# Patient Record
Sex: FEMALE | Race: ASIAN | NOT HISPANIC OR LATINO
[De-identification: names, ages, dates, MRNs, and addresses within clinical notes are randomized per-mention and may not be internally consistent; named-entity substitution may affect disease eponyms.]

---

## 2017-01-06 ENCOUNTER — APPOINTMENT (OUTPATIENT)
Dept: PULMONOLOGY | Facility: CLINIC | Age: 42
End: 2017-01-06

## 2017-03-01 ENCOUNTER — APPOINTMENT (OUTPATIENT)
Dept: PULMONOLOGY | Facility: CLINIC | Age: 42
End: 2017-03-01

## 2017-07-01 ENCOUNTER — RX RENEWAL (OUTPATIENT)
Age: 42
End: 2017-07-01

## 2017-08-22 ENCOUNTER — RX RENEWAL (OUTPATIENT)
Age: 42
End: 2017-08-22

## 2017-10-02 ENCOUNTER — APPOINTMENT (OUTPATIENT)
Dept: PULMONOLOGY | Facility: CLINIC | Age: 42
End: 2017-10-02

## 2017-10-23 ENCOUNTER — APPOINTMENT (OUTPATIENT)
Dept: PULMONOLOGY | Facility: CLINIC | Age: 42
End: 2017-10-23

## 2018-02-26 ENCOUNTER — APPOINTMENT (OUTPATIENT)
Dept: PULMONOLOGY | Facility: CLINIC | Age: 43
End: 2018-02-26
Payer: OTHER GOVERNMENT

## 2018-02-26 VITALS
WEIGHT: 120 LBS | HEIGHT: 60.5 IN | OXYGEN SATURATION: 98 % | HEART RATE: 99 BPM | SYSTOLIC BLOOD PRESSURE: 100 MMHG | RESPIRATION RATE: 17 BRPM | BODY MASS INDEX: 22.95 KG/M2 | DIASTOLIC BLOOD PRESSURE: 70 MMHG

## 2018-02-26 DIAGNOSIS — R51 HEADACHE: ICD-10-CM

## 2018-02-26 PROCEDURE — 99214 OFFICE O/P EST MOD 30 MIN: CPT

## 2018-02-26 RX ORDER — BENZONATATE 200 MG/1
200 CAPSULE ORAL
Qty: 60 | Refills: 0 | Status: ACTIVE | COMMUNITY
Start: 2018-02-26 | End: 1900-01-01

## 2018-02-26 RX ORDER — HYDROCODONE BITARTRATE AND HOMATROPINE METHYLBROMIDE 5; 1.5 MG/5ML; MG/5ML
5-1.5 SYRUP ORAL
Qty: 240 | Refills: 0 | Status: ACTIVE | COMMUNITY
Start: 2018-02-26 | End: 1900-01-01

## 2018-03-09 ENCOUNTER — MEDICATION RENEWAL (OUTPATIENT)
Age: 43
End: 2018-03-09

## 2018-03-27 ENCOUNTER — APPOINTMENT (OUTPATIENT)
Dept: PULMONOLOGY | Facility: CLINIC | Age: 43
End: 2018-03-27
Payer: OTHER GOVERNMENT

## 2018-03-27 VITALS
BODY MASS INDEX: 23.56 KG/M2 | HEART RATE: 90 BPM | OXYGEN SATURATION: 98 % | HEIGHT: 60 IN | DIASTOLIC BLOOD PRESSURE: 60 MMHG | SYSTOLIC BLOOD PRESSURE: 110 MMHG | WEIGHT: 120 LBS

## 2018-03-27 PROCEDURE — 94010 BREATHING CAPACITY TEST: CPT

## 2018-03-27 PROCEDURE — 99214 OFFICE O/P EST MOD 30 MIN: CPT | Mod: 25

## 2018-03-27 RX ORDER — BROMPHENIRAMINE MALEATE, PSEUDOEPHEDRINE HYDROCHLORIDE, 2; 30; 10 MG/5ML; MG/5ML; MG/5ML
30-2-10 SYRUP ORAL
Qty: 105 | Refills: 0 | Status: ACTIVE | COMMUNITY
Start: 2017-10-02

## 2018-03-27 RX ORDER — GLYCOPYRROLATE AND FORMOTEROL FUMARATE 9; 4.8 UG/1; UG/1
9-4.8 AEROSOL, METERED RESPIRATORY (INHALATION)
Qty: 3 | Refills: 1 | Status: ACTIVE | COMMUNITY
Start: 2018-03-27 | End: 1900-01-01

## 2018-04-09 RX ORDER — CLARITHROMYCIN 500 MG/1
500 TABLET, FILM COATED ORAL
Qty: 20 | Refills: 0 | Status: DISCONTINUED | COMMUNITY
Start: 2018-02-26 | End: 2018-04-09

## 2018-04-09 RX ORDER — BECLOMETHASONE DIPROPIONATE 80 UG/1
80 AEROSOL, METERED RESPIRATORY (INHALATION) TWICE DAILY
Qty: 3 | Refills: 1 | Status: ACTIVE | COMMUNITY
Start: 2018-04-09 | End: 1900-01-01

## 2018-04-17 ENCOUNTER — EMERGENCY (EMERGENCY)
Facility: HOSPITAL | Age: 43
LOS: 1 days | Discharge: HOME | End: 2018-04-17
Attending: PERSONAL EMERGENCY RESPONSE ATTENDANT
Payer: COMMERCIAL

## 2018-04-17 ENCOUNTER — EMERGENCY (EMERGENCY)
Facility: HOSPITAL | Age: 43
LOS: 1 days | Discharge: LEFT BEFORE DISCHARGE | End: 2018-04-17
Admitting: EMERGENCY MEDICINE

## 2018-04-17 VITALS
HEIGHT: 61 IN | RESPIRATION RATE: 17 BRPM | SYSTOLIC BLOOD PRESSURE: 152 MMHG | DIASTOLIC BLOOD PRESSURE: 94 MMHG | HEART RATE: 75 BPM | WEIGHT: 119.93 LBS | OXYGEN SATURATION: 100 % | TEMPERATURE: 99 F

## 2018-04-17 LAB
ALBUMIN SERPL ELPH-MCNC: 4.1 G/DL — SIGNIFICANT CHANGE UP (ref 3.3–5)
ALP SERPL-CCNC: 56 U/L — SIGNIFICANT CHANGE UP (ref 40–120)
ALT FLD-CCNC: 15 U/L — SIGNIFICANT CHANGE UP (ref 10–45)
ANION GAP SERPL CALC-SCNC: 12 MMOL/L — SIGNIFICANT CHANGE UP (ref 5–17)
ANISOCYTOSIS BLD QL: SLIGHT — SIGNIFICANT CHANGE UP
APTT BLD: 26.9 SEC — LOW (ref 27.5–37.4)
AST SERPL-CCNC: 13 U/L — SIGNIFICANT CHANGE UP (ref 10–40)
BASE EXCESS BLDV CALC-SCNC: 0.7 MMOL/L — SIGNIFICANT CHANGE UP (ref -2–2)
BASOPHILS # BLD AUTO: 0 K/UL — SIGNIFICANT CHANGE UP (ref 0–0.2)
BILIRUB SERPL-MCNC: 0.3 MG/DL — SIGNIFICANT CHANGE UP (ref 0.2–1.2)
BLD GP AB SCN SERPL QL: NEGATIVE — SIGNIFICANT CHANGE UP
BUN SERPL-MCNC: 14 MG/DL — SIGNIFICANT CHANGE UP (ref 7–23)
CA-I SERPL-SCNC: 1.24 MMOL/L — SIGNIFICANT CHANGE UP (ref 1.12–1.3)
CALCIUM SERPL-MCNC: 9.6 MG/DL — SIGNIFICANT CHANGE UP (ref 8.4–10.5)
CHLORIDE BLDV-SCNC: 104 MMOL/L — SIGNIFICANT CHANGE UP (ref 96–108)
CHLORIDE SERPL-SCNC: 100 MMOL/L — SIGNIFICANT CHANGE UP (ref 96–108)
CO2 BLDV-SCNC: 27 MMOL/L — SIGNIFICANT CHANGE UP (ref 22–30)
CO2 SERPL-SCNC: 23 MMOL/L — SIGNIFICANT CHANGE UP (ref 22–31)
CREAT SERPL-MCNC: 0.75 MG/DL — SIGNIFICANT CHANGE UP (ref 0.5–1.3)
ELLIPTOCYTES BLD QL SMEAR: SLIGHT — SIGNIFICANT CHANGE UP
EOSINOPHIL # BLD AUTO: 1.9 K/UL — HIGH (ref 0–0.5)
EOSINOPHIL NFR BLD AUTO: 14 % — HIGH (ref 0–6)
GAS PNL BLDV: 136 MMOL/L — SIGNIFICANT CHANGE UP (ref 136–145)
GAS PNL BLDV: SIGNIFICANT CHANGE UP
GLUCOSE BLDV-MCNC: 98 MG/DL — SIGNIFICANT CHANGE UP (ref 70–99)
GLUCOSE SERPL-MCNC: 96 MG/DL — SIGNIFICANT CHANGE UP (ref 70–99)
HCO3 BLDV-SCNC: 26 MMOL/L — SIGNIFICANT CHANGE UP (ref 21–29)
HCT VFR BLD CALC: 28.2 % — LOW (ref 34.5–45)
HCT VFR BLDA CALC: 26 % — LOW (ref 39–50)
HGB BLD CALC-MCNC: 8.5 G/DL — LOW (ref 11.5–15.5)
HGB BLD-MCNC: 8.6 G/DL — LOW (ref 11.5–15.5)
HYPOCHROMIA BLD QL: SIGNIFICANT CHANGE UP
INR BLD: 0.98 RATIO — SIGNIFICANT CHANGE UP (ref 0.88–1.16)
LACTATE BLDV-MCNC: 1.1 MMOL/L — SIGNIFICANT CHANGE UP (ref 0.7–2)
LYMPHOCYTES # BLD AUTO: 27 % — SIGNIFICANT CHANGE UP (ref 13–44)
LYMPHOCYTES # BLD AUTO: 4.3 K/UL — HIGH (ref 1–3.3)
MACROCYTES BLD QL: SLIGHT — SIGNIFICANT CHANGE UP
MCHC RBC-ENTMCNC: 20.2 PG — LOW (ref 27–34)
MCHC RBC-ENTMCNC: 30.4 GM/DL — LOW (ref 32–36)
MCV RBC AUTO: 66.6 FL — LOW (ref 80–100)
MICROCYTES BLD QL: SIGNIFICANT CHANGE UP
MONOCYTES # BLD AUTO: 0.8 K/UL — SIGNIFICANT CHANGE UP (ref 0–0.9)
MONOCYTES NFR BLD AUTO: 6 % — SIGNIFICANT CHANGE UP (ref 2–14)
NEUTROPHILS # BLD AUTO: 7.4 K/UL — SIGNIFICANT CHANGE UP (ref 1.8–7.4)
NEUTROPHILS NFR BLD AUTO: 53 % — SIGNIFICANT CHANGE UP (ref 43–77)
OTHER CELLS CSF MANUAL: 3 ML/DL — LOW (ref 18–22)
PCO2 BLDV: 45 MMHG — SIGNIFICANT CHANGE UP (ref 35–50)
PH BLDV: 7.37 — SIGNIFICANT CHANGE UP (ref 7.35–7.45)
PLAT MORPH BLD: NORMAL — SIGNIFICANT CHANGE UP
PLATELET # BLD AUTO: 489 K/UL — HIGH (ref 150–400)
PO2 BLDV: 23 MMHG — LOW (ref 25–45)
POIKILOCYTOSIS BLD QL AUTO: SIGNIFICANT CHANGE UP
POLYCHROMASIA BLD QL SMEAR: SLIGHT — SIGNIFICANT CHANGE UP
POTASSIUM BLDV-SCNC: 4.2 MMOL/L — SIGNIFICANT CHANGE UP (ref 3.5–5)
POTASSIUM SERPL-MCNC: 4.3 MMOL/L — SIGNIFICANT CHANGE UP (ref 3.5–5.3)
POTASSIUM SERPL-SCNC: 4.3 MMOL/L — SIGNIFICANT CHANGE UP (ref 3.5–5.3)
PROT SERPL-MCNC: 7.5 G/DL — SIGNIFICANT CHANGE UP (ref 6–8.3)
PROTHROM AB SERPL-ACNC: 10.6 SEC — SIGNIFICANT CHANGE UP (ref 9.8–12.7)
RBC # BLD: 4.23 M/UL — SIGNIFICANT CHANGE UP (ref 3.8–5.2)
RBC # FLD: 17.2 % — HIGH (ref 10.3–14.5)
RBC BLD AUTO: ABNORMAL
RH IG SCN BLD-IMP: POSITIVE — SIGNIFICANT CHANGE UP
SAO2 % BLDV: 25 % — LOW (ref 67–88)
SODIUM SERPL-SCNC: 135 MMOL/L — SIGNIFICANT CHANGE UP (ref 135–145)
TARGETS BLD QL SMEAR: SLIGHT — SIGNIFICANT CHANGE UP
WBC # BLD: 14.6 K/UL — HIGH (ref 3.8–10.5)
WBC # FLD AUTO: 14.6 K/UL — HIGH (ref 3.8–10.5)

## 2018-04-17 PROCEDURE — 80053 COMPREHEN METABOLIC PANEL: CPT

## 2018-04-17 PROCEDURE — 85027 COMPLETE CBC AUTOMATED: CPT

## 2018-04-17 PROCEDURE — 93010 ELECTROCARDIOGRAM REPORT: CPT

## 2018-04-17 PROCEDURE — 82803 BLOOD GASES ANY COMBINATION: CPT

## 2018-04-17 PROCEDURE — 99283 EMERGENCY DEPT VISIT LOW MDM: CPT | Mod: 25

## 2018-04-17 PROCEDURE — 84484 ASSAY OF TROPONIN QUANT: CPT

## 2018-04-17 PROCEDURE — 85730 THROMBOPLASTIN TIME PARTIAL: CPT

## 2018-04-17 PROCEDURE — 84295 ASSAY OF SERUM SODIUM: CPT

## 2018-04-17 PROCEDURE — 81001 URINALYSIS AUTO W/SCOPE: CPT

## 2018-04-17 PROCEDURE — 84132 ASSAY OF SERUM POTASSIUM: CPT

## 2018-04-17 PROCEDURE — 83605 ASSAY OF LACTIC ACID: CPT

## 2018-04-17 PROCEDURE — 82947 ASSAY GLUCOSE BLOOD QUANT: CPT

## 2018-04-17 PROCEDURE — 94640 AIRWAY INHALATION TREATMENT: CPT

## 2018-04-17 PROCEDURE — 82435 ASSAY OF BLOOD CHLORIDE: CPT

## 2018-04-17 PROCEDURE — 86900 BLOOD TYPING SEROLOGIC ABO: CPT

## 2018-04-17 PROCEDURE — 85014 HEMATOCRIT: CPT

## 2018-04-17 PROCEDURE — 93005 ELECTROCARDIOGRAM TRACING: CPT

## 2018-04-17 PROCEDURE — 85610 PROTHROMBIN TIME: CPT

## 2018-04-17 PROCEDURE — 86901 BLOOD TYPING SEROLOGIC RH(D): CPT

## 2018-04-17 PROCEDURE — 99285 EMERGENCY DEPT VISIT HI MDM: CPT | Mod: 25

## 2018-04-17 PROCEDURE — 82330 ASSAY OF CALCIUM: CPT

## 2018-04-17 PROCEDURE — 86850 RBC ANTIBODY SCREEN: CPT

## 2018-04-17 RX ORDER — ALBUTEROL 90 UG/1
1 AEROSOL, METERED ORAL EVERY 4 HOURS
Qty: 0 | Refills: 0 | Status: DISCONTINUED | OUTPATIENT
Start: 2018-04-17 | End: 2018-04-21

## 2018-04-17 RX ORDER — TIOTROPIUM BROMIDE 18 UG/1
1 CAPSULE ORAL; RESPIRATORY (INHALATION) DAILY
Qty: 0 | Refills: 0 | Status: DISCONTINUED | OUTPATIENT
Start: 2018-04-17 | End: 2018-04-21

## 2018-04-17 RX ORDER — IPRATROPIUM/ALBUTEROL SULFATE 18-103MCG
3 AEROSOL WITH ADAPTER (GRAM) INHALATION EVERY 6 HOURS
Qty: 0 | Refills: 0 | Status: DISCONTINUED | OUTPATIENT
Start: 2018-04-17 | End: 2018-04-21

## 2018-04-17 RX ADMIN — Medication 3 MILLILITER(S): at 23:01

## 2018-04-17 NOTE — ED PROVIDER NOTE - PROGRESS NOTE DETAILS
Well appearing, no reoccurrence of sx. Continues to have coarse breath sounds but moving air throughout all lung fields. Pt is not currently on oral steroids, nor does feel that she is asthma exacerbated. Takes inhaled steroids and f/u with pulmonology. Denies SOB during time of syncope last night. Labs urine non actionable. EKG non actionable. Pt has no pain associated with this event. Reassured and advised to f/u with cardiology for routine exam and ongoing medical management. Given strict return precautions. Return for any and all of the following: CP, reoccurring syncope, lightheadedness, SOB, severe chest abd or back pain. Pt verbalizes understanding of these recommendations. And is stable for discharge.

## 2018-04-17 NOTE — ED PROVIDER NOTE - NS_ ATTENDINGSCRIBEDETAILS _ED_A_ED_FT
Attending MD St.  Agree with above.  PT seen and assessed with scribe assistance in documentation in real time.

## 2018-04-17 NOTE — ED PROVIDER NOTE - MEDICAL DECISION MAKING DETAILS
44 y/o F pt with PMHx of anemia, asthma presents to the ED for lightheadedness/dizziness and palpitations s/p syncope episode last night. 42 y/o well appearing F pt with PMHx of anemia, asthma presents to the ED for lightheadedness/dizziness and palpitations s/p syncope episode last night. No CP prior to, during or following event. Pt has synopsized previously with hx of anemia which required transfusion in the past. No personal or FHx of CAD. 44 y/o well appearing F pt with PMHx of anemia, asthma presents to the ED for lightheadedness/dizziness and palpitations s/p syncope episode last night. No CP prior to, during or following event. Pt has synopsized previously with hx of anemia which required transfusion in the past. No personal or FHx of CAD. No leg pain or swelling, no OCP. Pt is PERC negative. 44 y/o well appearing F pt with PMHx of anemia, asthma presents to the ED for lightheadedness/dizziness and palpitations s/p syncope episode last night. No CP prior to, during or following event. Pt has synopsized previously with hx of anemia which required transfusion in the past. No personal or FHx of CAD. No leg pain or swelling, no OCP. Pt is PERC negative.  Denies all pain complaints.  Denies urinary sxs, pregnancy negative.

## 2018-04-17 NOTE — ED PROVIDER NOTE - OBJECTIVE STATEMENT
44 y/o F pt with PMHx of anemia, asthma c/o lightheadedness/dizziness s/p syncopal episode last night with intermittent palpitations. Pt was using the bathroom and washed her hands when she heard a weird sound in her head and synopsized. As per , pt was down for a few seconds before she regained consciousness. Pt had hx of similar sx. In the past, she had a syncopal episode s/p leg surgery 1.5 years ago and had to get a blood transfusion. States that her left leg is always in pain but has improved since the injury. Pt comes to the ED today because she wanted to get a blood work completed to see if she needed a blood transfusion. Pt is sexually active. Pt is from [facility]. Denies CP, hx of blood clot, hx of CAD, FHx of CAD or any other complaints. LMP: March 20 2018 Current medication: Symbicort, Prilosec, Zyrtec

## 2018-04-18 VITALS
RESPIRATION RATE: 18 BRPM | OXYGEN SATURATION: 99 % | SYSTOLIC BLOOD PRESSURE: 148 MMHG | HEART RATE: 82 BPM | TEMPERATURE: 98 F | DIASTOLIC BLOOD PRESSURE: 89 MMHG

## 2018-04-18 LAB
APPEARANCE UR: CLEAR — SIGNIFICANT CHANGE UP
BILIRUB UR-MCNC: NEGATIVE — SIGNIFICANT CHANGE UP
COLOR SPEC: SIGNIFICANT CHANGE UP
DIFF PNL FLD: NEGATIVE — SIGNIFICANT CHANGE UP
GLUCOSE UR QL: NEGATIVE — SIGNIFICANT CHANGE UP
KETONES UR-MCNC: NEGATIVE — SIGNIFICANT CHANGE UP
LEUKOCYTE ESTERASE UR-ACNC: NEGATIVE — SIGNIFICANT CHANGE UP
NITRITE UR-MCNC: NEGATIVE — SIGNIFICANT CHANGE UP
PH UR: 5.5 — SIGNIFICANT CHANGE UP (ref 5–8)
PROT UR-MCNC: NEGATIVE — SIGNIFICANT CHANGE UP
RBC CASTS # UR COMP ASSIST: SIGNIFICANT CHANGE UP /HPF (ref 0–2)
SP GR SPEC: 1.01 — SIGNIFICANT CHANGE UP (ref 1.01–1.02)
UROBILINOGEN FLD QL: NEGATIVE — SIGNIFICANT CHANGE UP
WBC UR QL: SIGNIFICANT CHANGE UP /HPF (ref 0–5)

## 2018-04-18 NOTE — ED ADULT NURSE NOTE - OBJECTIVE STATEMENT
Received patient awake and alert x 4, presenting to the ED with syncopal episode last night along with intermittent palpitations. States she was washing her hands when suddenly her hearing felt fuzzy, as per  patient was down for a few seconds before regained consciousness. C/O back pain, states she does have a history of anemia and has had this episode in the past before, denies hitting head. denies fever/chills. breathing unlabored with no acute distress noted.

## 2018-06-19 ENCOUNTER — NON-APPOINTMENT (OUTPATIENT)
Age: 43
End: 2018-06-19

## 2018-06-19 ENCOUNTER — APPOINTMENT (OUTPATIENT)
Dept: PULMONOLOGY | Facility: CLINIC | Age: 43
End: 2018-06-19
Payer: OTHER GOVERNMENT

## 2018-06-19 VITALS
SYSTOLIC BLOOD PRESSURE: 120 MMHG | DIASTOLIC BLOOD PRESSURE: 70 MMHG | BODY MASS INDEX: 23.56 KG/M2 | OXYGEN SATURATION: 97 % | HEART RATE: 74 BPM | WEIGHT: 120 LBS | HEIGHT: 60 IN

## 2018-06-19 PROCEDURE — 94010 BREATHING CAPACITY TEST: CPT

## 2018-06-19 PROCEDURE — 99214 OFFICE O/P EST MOD 30 MIN: CPT | Mod: 25

## 2018-06-19 RX ORDER — DESLORATADINE 5 MG/1
5 TABLET ORAL DAILY
Qty: 90 | Refills: 1 | Status: ACTIVE | COMMUNITY
Start: 2018-06-19 | End: 1900-01-01

## 2018-06-19 RX ORDER — BECLOMETHASONE DIPROPIONATE 80 UG/1
80 AEROSOL, METERED RESPIRATORY (INHALATION) TWICE DAILY
Qty: 3 | Refills: 1 | Status: ACTIVE | COMMUNITY
Start: 2018-06-19 | End: 1900-01-01

## 2018-09-25 ENCOUNTER — NON-APPOINTMENT (OUTPATIENT)
Age: 43
End: 2018-09-25

## 2018-09-25 ENCOUNTER — APPOINTMENT (OUTPATIENT)
Dept: PULMONOLOGY | Facility: CLINIC | Age: 43
End: 2018-09-25
Payer: OTHER GOVERNMENT

## 2018-09-25 VITALS
DIASTOLIC BLOOD PRESSURE: 70 MMHG | BODY MASS INDEX: 24.35 KG/M2 | WEIGHT: 124 LBS | OXYGEN SATURATION: 97 % | HEIGHT: 60 IN | SYSTOLIC BLOOD PRESSURE: 120 MMHG | HEART RATE: 68 BPM

## 2018-09-25 PROCEDURE — 99214 OFFICE O/P EST MOD 30 MIN: CPT | Mod: 25

## 2018-09-25 PROCEDURE — 94010 BREATHING CAPACITY TEST: CPT

## 2018-09-25 RX ORDER — ACLIDINIUM BROMIDE 400 UG/1
400 POWDER, METERED RESPIRATORY (INHALATION) TWICE DAILY
Qty: 3 | Refills: 1 | Status: ACTIVE | COMMUNITY
Start: 2018-09-25 | End: 1900-01-01

## 2018-09-25 RX ORDER — FLUTICASONE PROPIONATE 220 UG/1
220 AEROSOL, METERED RESPIRATORY (INHALATION) TWICE DAILY
Qty: 3 | Refills: 5 | Status: ACTIVE | COMMUNITY
Start: 2018-09-25 | End: 1900-01-01

## 2018-09-25 RX ORDER — ZILEUTON 600 MG/1
600 TABLET, FILM COATED, EXTENDED RELEASE ORAL
Qty: 180 | Refills: 1 | Status: ACTIVE | COMMUNITY
Start: 2018-09-25 | End: 1900-01-01

## 2018-09-25 NOTE — ADDENDUM
[FreeTextEntry1] : Documented by Zach Rodriguez acting as a scribe for Dr. Jose Antonio Coe on 9/25/18\par \par All medical record entries made by the Scribe were at my, Dr. Jose Antonio Coe's, direction and personally dictated by me on 9/25/18. I have reviewed the chart and agree that the record accurately reflects my personal performance of the history, physical exam, assessment and plan. I have also personally directed, reviewed, and agree with the discharge instructions. \par \par \par \par \par

## 2018-09-25 NOTE — PROCEDURE
[FreeTextEntry1] : PFT- spi reveals mild to moderate obstructive dysfunction; FEV1 was 1.52 L which is 66% of predicted; normal flow volume loop

## 2018-09-25 NOTE — HISTORY OF PRESENT ILLNESS
[FreeTextEntry1] : Ms. Mortensen is a 43 year old female presenting to the office for a follow up visit for allergic rhinitis, asthma, cough, GERD, low vitamin D, post-nasal drip, sinus issues, snoring and shortness of breath. Her chief complaint is sin\par - She comes in complaining of sinus congestion \par - She comes in stating that she was sick the previous week leading to current wheezing\par - She had a frequent sour taste in her mouth 3 days ago\par - She has itchy eyes an ears\par - She has gained some weight\par - She is bringing up light yellow and clear sputum. \par - She is sleeping well. \par - She is unsure as to whether she snores. \par - She expresses a desire to avoid prednisone.

## 2018-09-25 NOTE — ASSESSMENT
[FreeTextEntry1] : Ms. Mortensen is currently symptomatic of eosinophilic/ allergic asthma, allergy, and self-induced GERD. She is non-compliant with her treatment protocol. \par \par Her shortness of breath is multifactorial due to: \par -out of shape\par -poor breathing mechanics\par -asthma\par \par problem 1: eosinophilic asthma/severe persistent asthma\par -add Symbicort 160 2 inhalations BID\par - Continue Flovent MDI 2 inhalations BID (200)\par - Add Tudorza 1 inhalation BID\par -add Albuterol nebulizer at least BID up to QiD (gargle and spit after use) \par -pending results of recent CBC--patient potentially a candidate for Fasenra\par -Asthma is  believed to be caused by inherited (genetic) and environmental factor, but its exact cause is unknown. Asthma may be triggered by allergens, lung infections, or irritants in the air. Asthma triggers are different for each person\par \par problem 2: allergies/sinus\par - Add Zyflo 1200 mg BID\par - Recommended the Navage \par -continue Dymista nasal spray 1 inhalation per nostril BID \par -continue Zyrtec 10 mg QHS \par - Continue Clarinex 5 mg QHS\par - Recheck eosinophil level, IgE level, Vitamin D level \par -Environmental measures for allergies were encouraged including mattress and pillow cover, air purifier, and environmental controls. \par \par problem 3: GERD\par - Add Zantac 300 mg QHS\par -continue Omeprazole 20mg \par -Things to avoid including overeating, spicy foods, tight clothing, eating within three hours of bed, this list is not all inclusive. \par -For treatment of reflux, possible options discussed including diet control, H2 blockers, PPIs, as well as coating motility agents discussed as treatment options. Timing of meals and proximity of last meal to sleep were discussed. If symptoms persist, a formal gastrointestinal evaluation is needed.\par -Rule of 2's: Avoid eating too late, too fast, too much, too spicy or within two hours of bedtime \par \par problem 4: low vitamin D\par -recommended to take Vitamin D supplements 50,000 units twice monthly \par -Low vitamin D Has been associated with asthma exacerbations and increased allergic symptoms. The goal based on recent information is maintaining levels between 50-70 and low normal is 30. Recommended 50,000 units every two weeks to once a month depending on the level. \par \par Problem 5: r/o OSAS\par -She is to have a in lab sleep study to rule out OSAS (as per her insurance) \par - Due to his / her EDS, fatigue, snoring, elevated Mallampati class, poor memory, poor concentration, and neck size, he / she is being recommended for a home sleep study. \par - Sleep apnea is associated with adverse clinical consequences which an affect most organ systems. Cardiovascular disease risk includes arrhythmias, atrial fibrillation, hypertension, coronary artery disease, and stroke. Metabolic disorders include diabetes type 2, non-alcoholic fatty liver disease. Mood disorder especially depression; and cognitive decline especially in the elderly. Associations with chronic reflux/Tuttle’s esophagus some but not all inclusive. \par -Reasons include arousal consistent with hypopnea; respiratory events most prominent in REM sleep or supine position; therefore sleep staging and body position are important for accurate diagnosis and estimation of AHI. \par \par problem 6: poor breathing mechanics\par -Proper breathing techniques were reviewed with an emphasis of exhalation. Patient instructed to breath in for 1 second and out for four seconds. Patient was encouraged to not talk while walking.\par \par problem 7: out of shape\par -Exercise and diet control were discussed and are highly encouraged. Treatment options were given such as, aqua therapy, and contacting a nutritionist. Recommended to use the elliptical, stationary bike, less use of treadmill. Mindful eating was explained to the patient. Obesity is associated with worsening asthma, shortness of breath, and potential for cardiac disease, diabetes, and other underlying medical conditions. \par \par problem 8: health maintenance \par -recommended to continute to diet and exercise, try to have an anti-inflammatory diet \par -recommended influenza vaccination\par -recommended strep pneumonia vaccines: Prevnar-13 vaccine, followed by Pneumo vaccine 23 one year following\par -recommended early intervention for URIs\par -recommended regular osteoporosis evaluations\par -recommended early dermatological evaluations\par -recommended after the age of 50 to the age of 70, colonoscopy every 5 years \par  \par \par F/u in 2 months \par pt is encouraged to call or fax the office with any questions or concerns.

## 2018-09-25 NOTE — PHYSICAL EXAM

## 2018-09-25 NOTE — REASON FOR VISIT
[Follow-Up] : a follow-up visit [FreeTextEntry1] : allergic rhinitis, asthma, cough, GERD, low vitamin D, post-nasal drip, sinus issues, snoring and shortness of breath

## 2018-11-27 ENCOUNTER — RX RENEWAL (OUTPATIENT)
Age: 43
End: 2018-11-27

## 2019-02-14 ENCOUNTER — APPOINTMENT (OUTPATIENT)
Dept: PULMONOLOGY | Facility: CLINIC | Age: 44
End: 2019-02-14
Payer: OTHER GOVERNMENT

## 2019-02-14 ENCOUNTER — NON-APPOINTMENT (OUTPATIENT)
Age: 44
End: 2019-02-14

## 2019-02-14 VITALS
RESPIRATION RATE: 17 BRPM | HEART RATE: 85 BPM | BODY MASS INDEX: 22.97 KG/M2 | SYSTOLIC BLOOD PRESSURE: 124 MMHG | OXYGEN SATURATION: 96 % | HEIGHT: 60 IN | WEIGHT: 117 LBS | DIASTOLIC BLOOD PRESSURE: 70 MMHG

## 2019-02-14 PROCEDURE — 99214 OFFICE O/P EST MOD 30 MIN: CPT | Mod: 25

## 2019-02-14 PROCEDURE — 94010 BREATHING CAPACITY TEST: CPT

## 2019-02-14 RX ORDER — GUAIFENESIN AND CODEINE PHOSPHATE 100; 10 MG/5ML; MG/5ML
100-10 SYRUP ORAL
Qty: 240 | Refills: 0 | Status: ACTIVE | COMMUNITY
Start: 2019-02-14 | End: 1900-01-01

## 2019-02-14 RX ORDER — ZAFIRLUKAST 20 MG/1
20 TABLET, COATED ORAL
Qty: 1 | Refills: 0 | Status: ACTIVE | COMMUNITY
Start: 2019-02-14 | End: 1900-01-01

## 2019-02-14 NOTE — ASSESSMENT
[FreeTextEntry1] : The plan is as follows: \par \par problem 1: asthmatic bronchitis with asthma flare\par -add doxycycline for bronchitis and sinus symptoms\par -Start Prednisone taper of 30 mg x 5 days, 20 mg x 5 days and 10 mg x 5 days- educational/instructional sheet provided that discusses side effects and directions given to patient.    Patient will need to follow up with a call in 5 days.\par -hold Symbicort\par -add Trelegy inhaler 1 puff once daily in am rinse and gargle (14 day sample given)- once complete go back to Symbicort 160 2 puffs BID rinse and gargle after use\par -add Albuterol nebulizer at least BID up to QiD (gargle and spit after use)\par -add accolate 20 mg PO BID \par -blood work needs to be completed to assess candidacy for biologics- will also need pre and post BD PFTs\par \par problem 2: post nasal drip and sinus congestion \par -restart fluticasone 1 spray each nostril am and pm\par -add azelastine nasal spray 2 sprays each nostril am and pm\par -continue Zyrtec 10 mg QHS \par - Recheck eosinophil level, IgE level, Vitamin D level \par \par problem 3: GERD-with increased symptoms\par - Add Zantac 300 mg QHS\par -continue prilosec 20mg in am - pt did not tolerate higher dose \par \par problem 4: low vitamin D\par -recommended to take Vitamin D supplements 50,000 units twice monthly \par -Low vitamin D Has been associated with asthma exacerbations and increased allergic symptoms. The goal based on recent information is maintaining levels between 50-70 and low normal is 30. Recommended 50,000 units every two weeks to once a month depending on the level. \par \par Problem 5: r/o OSAS- hx of snoring, fatigue\par -She is to have a in lab sleep study to rule out OSAS (as per her insurance) - pt has yet to complete this\par  \par \par F/u in 2 months/as scheduled with Dr. Coe\par She was advised to give me an update next week\par pt is encouraged to call or fax the office with any questions or concerns.

## 2019-02-14 NOTE — PHYSICAL EXAM

## 2019-02-14 NOTE — REVIEW OF SYSTEMS
[Fever] : no fever [Chills] : no chills [Fatigue] : fatigue [Poor Appetite] : normal appetite  [Nasal Congestion] : nasal congestion [Postnasal Drip] : postnasal drip [Sinus Problems] : sinus problems [Sore Throat] : no sore throat [Dry Mouth] : no dry mouth [Mouth Ulcers] : no mouth ulcers [Cough] : cough [Sputum] : sputum  [Dyspnea] : dyspnea [Chest Tightness] : chest tightness [Wheezing] : wheezing [Heartburn] : heartburn [Reflux] : reflux [Dysphagia] : no dysphagia [Nausea] : no nausea [Vomiting] : no vomiting [Constipation] : no constipation [Diarrhea] : no diarrhea [Abdominal Pain] : no abdominal pain [As Noted in HPI] : as noted in HPI [Headache] : headache [Difficulty Initiating Sleep] : difficulty falling asleep [Difficulty Maintaining Sleep] : difficulty maintaining sleep [Nonrestorative Sleep] : nonrestorative sleep [Negative] : Pulmonary Hypertension [de-identified] : due to cough

## 2019-02-14 NOTE — HISTORY OF PRESENT ILLNESS
[FreeTextEntry1] : Ms. Mortensen is a 43 year old female presenting to the office for an acute visit for allergic rhinitis, asthma, cough, GERD, low vitamin D, post-nasal drip, sinus issues, snoring and shortness of breath. Her chief complaint is cough and SOB. \par \par She reports that she has been sick since December. She was complaining of cough, congestion, fevers, and sinus complaints. She was placed on augmentin and she reports she had some relief however her symptoms have returned aside from fever. \par \par She reports cough with yellow phlegm, chest congestion, shortness of breath- especially with stairs. She states she is dealing with severe nasal congestion and post nasal drip. She reports wheezing, heaviness and tightness in her chest and poor sleep due to cough.  She is currently using her rescue inhaler and Symbicort only.  She admits to increased reflux and she is on OTC PPI.\par \par She otherwise denies fever, chills, body aches and pain, chest pain, appetite loss, sore throat, ear pain or pressure, rashes or new muscle cramps. \par \par She has a nebulizer with albuterol at home. Currently not using it. \par \par She never completed blood work previously prescribed but plans to get it done to see if she is a candidate for biologics. She is not sure she would go on one at this point but is willing to find out if it is an option for her down the road.

## 2019-02-19 ENCOUNTER — APPOINTMENT (OUTPATIENT)
Dept: PULMONOLOGY | Facility: CLINIC | Age: 44
End: 2019-02-19

## 2019-02-26 ENCOUNTER — MEDICATION RENEWAL (OUTPATIENT)
Age: 44
End: 2019-02-26

## 2019-03-11 RX ORDER — AZELASTINE HYDROCHLORIDE 205.5 UG/1
0.15 SPRAY, METERED NASAL TWICE DAILY
Qty: 3 | Refills: 1 | Status: ACTIVE | COMMUNITY
Start: 2019-02-14 | End: 1900-01-01

## 2019-03-11 RX ORDER — RANITIDINE 300 MG/1
300 TABLET ORAL
Qty: 90 | Refills: 1 | Status: ACTIVE | COMMUNITY
Start: 2019-02-14 | End: 1900-01-01

## 2019-06-24 ENCOUNTER — APPOINTMENT (OUTPATIENT)
Dept: PULMONOLOGY | Facility: CLINIC | Age: 44
End: 2019-06-24

## 2019-08-28 ENCOUNTER — APPOINTMENT (OUTPATIENT)
Dept: OBGYN | Facility: CLINIC | Age: 44
End: 2019-08-28

## 2019-09-23 ENCOUNTER — RX RENEWAL (OUTPATIENT)
Age: 44
End: 2019-09-23

## 2019-09-26 ENCOUNTER — NON-APPOINTMENT (OUTPATIENT)
Age: 44
End: 2019-09-26

## 2019-09-26 ENCOUNTER — APPOINTMENT (OUTPATIENT)
Dept: PULMONOLOGY | Facility: CLINIC | Age: 44
End: 2019-09-26
Payer: OTHER GOVERNMENT

## 2019-09-26 VITALS
HEIGHT: 60 IN | SYSTOLIC BLOOD PRESSURE: 110 MMHG | WEIGHT: 118 LBS | RESPIRATION RATE: 17 BRPM | OXYGEN SATURATION: 96 % | HEART RATE: 81 BPM | DIASTOLIC BLOOD PRESSURE: 80 MMHG | BODY MASS INDEX: 23.16 KG/M2

## 2019-09-26 DIAGNOSIS — Z86.2 PERSONAL HISTORY OF DISEASES OF THE BLOOD AND BLOOD-FORMING ORGANS AND CERTAIN DISORDERS INVOLVING THE IMMUNE MECHANISM: ICD-10-CM

## 2019-09-26 DIAGNOSIS — R09.89 OTHER SPECIFIED SYMPTOMS AND SIGNS INVOLVING THE CIRCULATORY AND RESPIRATORY SYSTEMS: ICD-10-CM

## 2019-09-26 PROCEDURE — 71046 X-RAY EXAM CHEST 2 VIEWS: CPT

## 2019-09-26 PROCEDURE — 99215 OFFICE O/P EST HI 40 MIN: CPT | Mod: 25

## 2019-09-26 PROCEDURE — 94010 BREATHING CAPACITY TEST: CPT

## 2019-09-26 RX ORDER — ZAFIRLUKAST 20 MG/1
20 TABLET, COATED ORAL
Qty: 60 | Refills: 3 | Status: ACTIVE | COMMUNITY
Start: 2019-09-26 | End: 1900-01-01

## 2019-09-26 RX ORDER — AZELASTINE HYDROCHLORIDE 137 UG/1
0.1 SPRAY, METERED NASAL TWICE DAILY
Qty: 1 | Refills: 3 | Status: ACTIVE | COMMUNITY
Start: 2019-09-26 | End: 1900-01-01

## 2019-09-26 RX ORDER — FLUTICASONE PROPIONATE 93 UG/1
93 SPRAY, METERED NASAL
Qty: 1 | Refills: 0 | Status: ACTIVE | COMMUNITY
Start: 2019-09-26 | End: 1900-01-01

## 2019-09-26 RX ORDER — CLARITHROMYCIN 500 MG/1
500 TABLET, FILM COATED ORAL
Qty: 20 | Refills: 0 | Status: ACTIVE | COMMUNITY
Start: 2019-09-26 | End: 1900-01-01

## 2019-10-04 NOTE — PHYSICAL EXAM
[General Appearance - Well Developed] : well developed [Normal Appearance] : normal appearance [General Appearance - Well Nourished] : well nourished [Well Groomed] : well groomed [No Deformities] : no deformities [General Appearance - In No Acute Distress] : no acute distress [Normal Conjunctiva] : the conjunctiva exhibited no abnormalities [Eyelids - No Xanthelasma] : the eyelids demonstrated no xanthelasmas [II] : II [Normal Oropharynx] : normal oropharynx [Neck Appearance] : the appearance of the neck was normal [Neck Cervical Mass (___cm)] : no neck mass was observed [Jugular Venous Distention Increased] : there was no jugular-venous distention [Heart Sounds] : normal S1 and S2 [Heart Rate And Rhythm] : heart rate and rhythm were normal [Murmurs] : no murmurs present [Respiration, Rhythm And Depth] : normal respiratory rhythm and effort [Exaggerated Use Of Accessory Muscles For Inspiration] : no accessory muscle use [Abdomen Soft] : soft [Abdomen Tenderness] : non-tender [Abdomen Mass (___ Cm)] : no abdominal mass palpated [Gait - Sufficient For Exercise Testing] : the gait was sufficient for exercise testing [Abnormal Walk] : normal gait [Nail Clubbing] : no clubbing of the fingernails [Cyanosis, Localized] : no localized cyanosis [Petechial Hemorrhages (___cm)] : no petechial hemorrhages [Skin Color & Pigmentation] : normal skin color and pigmentation [] : no rash [No Venous Stasis] : no venous stasis [Skin Lesions] : no skin lesions [No Skin Ulcers] : no skin ulcer [No Xanthoma] : no  xanthoma was observed [No Focal Deficits] : no focal deficits [Oriented To Time, Place, And Person] : oriented to person, place, and time [Impaired Insight] : insight and judgment were intact [Affect] : the affect was normal [Mood] : the mood was normal [FreeTextEntry1] : I:E 1:3, expiratory wheeze and rhonchi b/l

## 2019-10-04 NOTE — PROCEDURE
[FreeTextEntry1] : SPI with very severe obstruction\par \par CXR: Interpreted by Dr. Coe\par -normal chest radiograph

## 2019-10-04 NOTE — REVIEW OF SYSTEMS
[Chills] : chills [Fatigue] : fatigue [Nasal Congestion] : nasal congestion [Postnasal Drip] : postnasal drip [Sinus Problems] : sinus problems [Cough] : cough [Sputum] : sputum  [Dyspnea] : dyspnea [Chest Tightness] : chest tightness [Wheezing] : wheezing [As Noted in HPI] : as noted in HPI [Heartburn] : heartburn [Reflux] : reflux [Headache] : headache [Difficulty Initiating Sleep] : difficulty falling asleep [Difficulty Maintaining Sleep] : difficulty maintaining sleep [Nonrestorative Sleep] : nonrestorative sleep [Negative] : Pulmonary Hypertension [Fever] : no fever [Poor Appetite] : normal appetite  [Sore Throat] : no sore throat [Dry Mouth] : no dry mouth [Mouth Ulcers] : no mouth ulcers [Dysphagia] : no dysphagia [Nausea] : no nausea [Vomiting] : no vomiting [Constipation] : no constipation [Diarrhea] : no diarrhea [Abdominal Pain] : no abdominal pain [de-identified] : due to cough

## 2019-10-04 NOTE — ASSESSMENT
[FreeTextEntry1] : The plan is as follows: \par \par problem 1: asthmatic bronchitis with severe asthma flare\par -add Biaxin 500 mg PO BID x 10 days\par -Start Prednisone taper of 30 mg x 5 days, 20 mg x 5 days and 10 mg x 5 days- educational/instructional sheet provided that discusses side effects and directions given to patient.    Patient will need to follow up with a call in 5 days.\par -add Albuterol nebulizer at least BID up to QiD (gargle and spit after use)\par -add Budesonide via neb BID rinse and gargle\par -Continue Symbicort 160 2 puffs BID rinse and gargle after use\par -add Spiriva Respimat 2 puffs \par -add accolate 20 BID-add accolate 20 mg PO BID \par -blood work needs to be completed to assess candidacy for biologics- will also need pre and post BD PFTs if not completed; pt can also have the other labs faxed over to see if she can qualify with those.\par \par problem 2: Nasal polyps and severe nasal congestion with PND\par -failed fluticasone and nasonex\par -trial xhance 1 spray am and pm\par -restart azelastine nasal spray 2 sprays am and pm\par -continue Zyrtec 10 mg QHS \par \par problem 3: GERD-with increased symptoms\par - Add pepcid 40 mg QHS\par -continue prilosec 20mg in am - pt did not tolerate higher dose \par \par problem 4: hx of eosinophilia and probable eosinophilic asthma\par -if eos are still high enough she is a candidate for biologic therapy for her severe persistent asthma\par -both Nucala and fasenra were discussed in detail\par -she is considering Nucala for home injection: Patient information was given to patient regarding drug including indication/commitment to q 4 week injections.  Discussed with patient that eosinophils may be one of the contributing cells that cause airway inflammation in those with asthma and with elevated levels greater than 150. Nucala works by reducing blood eosinophil levels and hopefully reducing airway inflammation, she will fill out paperwork today\par -Cost and coverage TBD once enrolled, discussed that it was an expensive drug and there may be a copay associated.\par -enrollment/prior auth may take weeks depending on insurance coverage/info requested by insurance.\par \par \par F/u in 2-3 months with SPI with Dr. Coe or myself \par She was advised to give me an update next week\par pt is encouraged to call or fax the office with any questions or concerns.

## 2019-12-08 ENCOUNTER — FORM ENCOUNTER (OUTPATIENT)
Age: 44
End: 2019-12-08

## 2019-12-09 ENCOUNTER — APPOINTMENT (OUTPATIENT)
Dept: OBGYN | Facility: CLINIC | Age: 44
End: 2019-12-09
Payer: OTHER GOVERNMENT

## 2019-12-09 ENCOUNTER — RESULT REVIEW (OUTPATIENT)
Age: 44
End: 2019-12-09

## 2019-12-09 PROCEDURE — 99386 PREV VISIT NEW AGE 40-64: CPT

## 2019-12-09 PROCEDURE — 36415 COLL VENOUS BLD VENIPUNCTURE: CPT

## 2019-12-11 ENCOUNTER — FORM ENCOUNTER (OUTPATIENT)
Age: 44
End: 2019-12-11

## 2019-12-23 ENCOUNTER — APPOINTMENT (OUTPATIENT)
Dept: PULMONOLOGY | Facility: CLINIC | Age: 44
End: 2019-12-23

## 2019-12-30 ENCOUNTER — APPOINTMENT (OUTPATIENT)
Dept: ULTRASOUND IMAGING | Facility: IMAGING CENTER | Age: 44
End: 2019-12-30
Payer: OTHER GOVERNMENT

## 2019-12-30 ENCOUNTER — OUTPATIENT (OUTPATIENT)
Dept: OUTPATIENT SERVICES | Facility: HOSPITAL | Age: 44
LOS: 1 days | End: 2019-12-30
Payer: COMMERCIAL

## 2019-12-30 ENCOUNTER — APPOINTMENT (OUTPATIENT)
Dept: MAMMOGRAPHY | Facility: IMAGING CENTER | Age: 44
End: 2019-12-30
Payer: OTHER GOVERNMENT

## 2019-12-30 DIAGNOSIS — Z00.8 ENCOUNTER FOR OTHER GENERAL EXAMINATION: ICD-10-CM

## 2019-12-30 PROCEDURE — 76856 US EXAM PELVIC COMPLETE: CPT

## 2019-12-30 PROCEDURE — 77063 BREAST TOMOSYNTHESIS BI: CPT | Mod: 26

## 2019-12-30 PROCEDURE — 76830 TRANSVAGINAL US NON-OB: CPT

## 2019-12-30 PROCEDURE — 76856 US EXAM PELVIC COMPLETE: CPT | Mod: 26

## 2019-12-30 PROCEDURE — 77067 SCR MAMMO BI INCL CAD: CPT

## 2019-12-30 PROCEDURE — 77063 BREAST TOMOSYNTHESIS BI: CPT

## 2019-12-30 PROCEDURE — 77067 SCR MAMMO BI INCL CAD: CPT | Mod: 26

## 2019-12-30 PROCEDURE — 76830 TRANSVAGINAL US NON-OB: CPT | Mod: 26

## 2020-01-06 ENCOUNTER — APPOINTMENT (OUTPATIENT)
Dept: PULMONOLOGY | Facility: CLINIC | Age: 45
End: 2020-01-06
Payer: COMMERCIAL

## 2020-01-06 VITALS
SYSTOLIC BLOOD PRESSURE: 128 MMHG | RESPIRATION RATE: 17 BRPM | DIASTOLIC BLOOD PRESSURE: 80 MMHG | BODY MASS INDEX: 23.75 KG/M2 | HEIGHT: 60 IN | HEART RATE: 131 BPM | WEIGHT: 121 LBS | OXYGEN SATURATION: 96 %

## 2020-01-06 DIAGNOSIS — Z29.9 ENCOUNTER FOR PROPHYLACTIC MEASURES, UNSPECIFIED: ICD-10-CM

## 2020-01-06 PROCEDURE — 99214 OFFICE O/P EST MOD 30 MIN: CPT

## 2020-01-06 RX ORDER — TIOTROPIUM BROMIDE INHALATION SPRAY 3.12 UG/1
2.5 SPRAY, METERED RESPIRATORY (INHALATION)
Qty: 1 | Refills: 3 | Status: ACTIVE | COMMUNITY
Start: 2018-06-19 | End: 1900-01-01

## 2020-01-06 RX ORDER — PREDNISONE 10 MG/1
10 TABLET ORAL
Qty: 60 | Refills: 0 | Status: ACTIVE | COMMUNITY
Start: 2019-09-26 | End: 1900-01-01

## 2020-01-06 RX ORDER — BUDESONIDE 0.5 MG/2ML
0.5 INHALANT ORAL
Qty: 2 | Refills: 1 | Status: ACTIVE | COMMUNITY
Start: 2019-09-26 | End: 1900-01-01

## 2020-01-06 RX ORDER — EPINEPHRINE 0.3 MG/.3ML
0.3 INJECTION INTRAMUSCULAR
Qty: 1 | Refills: 0 | Status: ACTIVE | COMMUNITY
Start: 2020-01-06 | End: 1900-01-01

## 2020-01-06 RX ORDER — MEPOLIZUMAB 100 MG/ML
100 INJECTION, SOLUTION SUBCUTANEOUS
Qty: 1 | Refills: 5 | Status: ACTIVE | COMMUNITY
Start: 2020-01-06 | End: 1900-01-01

## 2020-01-06 RX ORDER — MOMETASONE FUROATE AND FORMOTEROL FUMARATE DIHYDRATE 200; 5 UG/1; UG/1
200-5 AEROSOL RESPIRATORY (INHALATION) TWICE DAILY
Qty: 1 | Refills: 3 | Status: ACTIVE | COMMUNITY
Start: 2020-01-06 | End: 1900-01-01

## 2020-01-06 NOTE — REASON FOR VISIT
[Acute] : an acute visit [Asthma] : asthma [Cough] : cough [Wheezing] : wheezing [Shortness of Breath] : shortness of Breath

## 2020-01-09 ENCOUNTER — APPOINTMENT (OUTPATIENT)
Dept: PULMONOLOGY | Facility: CLINIC | Age: 45
End: 2020-01-09

## 2020-01-09 NOTE — REVIEW OF SYSTEMS
[Chills] : chills [Fatigue] : fatigue [Nasal Congestion] : nasal congestion [Sinus Problems] : sinus problems [Postnasal Drip] : postnasal drip [As Noted in HPI] : as noted in HPI [Sputum] : sputum  [Cough] : cough [Dyspnea] : dyspnea [Wheezing] : wheezing [Chest Tightness] : chest tightness [Difficulty Initiating Sleep] : difficulty falling asleep [Difficulty Maintaining Sleep] : difficulty maintaining sleep [Headache] : headache [Nonrestorative Sleep] : nonrestorative sleep [Fever] : no fever [Poor Appetite] : normal appetite  [Sore Throat] : no sore throat [Dry Mouth] : no dry mouth [Mouth Ulcers] : no mouth ulcers [Reflux] : no reflux [Dysphagia] : no dysphagia [Heartburn] : no heartburn [Vomiting] : no vomiting [Nausea] : no nausea [Constipation] : no constipation [Diarrhea] : no diarrhea [Abdominal Pain] : no abdominal pain [de-identified] : due to cough [Negative] : Gastrointestinal

## 2020-01-09 NOTE — ASSESSMENT
[FreeTextEntry1] : The plan is as follows: \par \par problem 1: asthmatic bronchitis with severe asthma flare\par -add doxycycline 100 mg PO BID x 10 days\par -Start Prednisone taper of 30 mg x 5 days, 20 mg x 5 days and 10 mg x 5 days- educational/instructional sheet provided that discusses side effects and directions given to patient.    Patient will need to follow up with a call in 5 days.\par -add Albuterol nebulizer at least BID up to QiD (gargle and spit after use)\par -add Budesonide via neb BID rinse and gargle\par -Start Dulera 200 2 puffs BID rinse and gargle after use; failed symbicort\par -add Spiriva Respimat 2 puffs \par -add accolate 20 BID-add accolate 20 mg PO BID \par -discussed in detail that at this point she needs to stay on all therapy as directed aside from prednisone, which she will be tapering\par -we had an in depth discussion about the role of adding on Nucala due to the severity of her asthma. \par -she is willing to go on therapy, insurance auth will be expedited, plan on getting Nucala injection sample on thursday and moving forward with home auto injector moving forward. \par \par problem 2: Nasal polyps and severe nasal congestion with PND\par -failed fluticasone and nasonex\par -continue xhance 1 spray am and pm\par -restart azelastine nasal spray 2 sprays am and pm\par -continue Zyrtec 10 mg QHS \par \par problem 3: GERD\par - Continue pepcid 40 mg QHS\par -continue prilosec 20mg in am - pt did not tolerate higher dose \par \par problem 4: hx of eosinophilia and probable eosinophilic asthma\par -she will be starting Nucala for home injection: Patient information was given to patient regarding drug including indication/commitment to q 4 week injections.  Discussed with patient that eosinophils may be one of the contributing cells that cause airway inflammation in those with asthma and with elevated levels greater than 150. Nucala works by reducing blood eosinophil levels and hopefully reducing airway inflammation.\par -Cost and coverage TBD once enrolled, discussed that it was an expensive drug and there may be a copay associated.\par -enrollment/prior auth may take weeks depending on insurance coverage/info requested by insurance.\par \par \par Pt to be back on Thursday for injection. \par F/u in 6-8 with SPI with Dr. Coe or myself \par She was advised to give me an update next week\par pt is encouraged to call or fax the office with any questions or concerns.

## 2020-01-09 NOTE — PHYSICAL EXAM
[Normal Appearance] : normal appearance [General Appearance - Well Developed] : well developed [General Appearance - Well Nourished] : well nourished [No Deformities] : no deformities [Well Groomed] : well groomed [Normal Conjunctiva] : the conjunctiva exhibited no abnormalities [General Appearance - In No Acute Distress] : no acute distress [Normal Oropharynx] : normal oropharynx [Eyelids - No Xanthelasma] : the eyelids demonstrated no xanthelasmas [II] : II [Neck Cervical Mass (___cm)] : no neck mass was observed [Neck Appearance] : the appearance of the neck was normal [Heart Rate And Rhythm] : heart rate and rhythm were normal [Jugular Venous Distention Increased] : there was no jugular-venous distention [Murmurs] : no murmurs present [Heart Sounds] : normal S1 and S2 [Exaggerated Use Of Accessory Muscles For Inspiration] : no accessory muscle use [Respiration, Rhythm And Depth] : normal respiratory rhythm and effort [Abdomen Soft] : soft [Abdomen Tenderness] : non-tender [Abdomen Mass (___ Cm)] : no abdominal mass palpated [Abnormal Walk] : normal gait [Gait - Sufficient For Exercise Testing] : the gait was sufficient for exercise testing [Nail Clubbing] : no clubbing of the fingernails [Petechial Hemorrhages (___cm)] : no petechial hemorrhages [Cyanosis, Localized] : no localized cyanosis [Skin Color & Pigmentation] : normal skin color and pigmentation [] : no rash [No Venous Stasis] : no venous stasis [No Xanthoma] : no  xanthoma was observed [No Skin Ulcers] : no skin ulcer [Skin Lesions] : no skin lesions [No Focal Deficits] : no focal deficits [Oriented To Time, Place, And Person] : oriented to person, place, and time [Impaired Insight] : insight and judgment were intact [Affect] : the affect was normal [Mood] : the mood was normal [FreeTextEntry1] : I:E 1:3, expiratory wheeze and rhonchi b/l

## 2020-01-09 NOTE — HISTORY OF PRESENT ILLNESS
[FreeTextEntry1] : Ms. Mortensen is a 44 year old female presenting to the office for an acute visit for allergic rhinitis, asthma, cough, GERD, low vitamin D, post-nasal drip, sinus issues, snoring and shortness of breath. Her chief complaint is cough and SOB. \par \par She reports that she has been dealing with illness on and off since her last visit here. She reports she gets treated for her symptoms- gets relief and weeks later the symptoms restart with SOB, cough and wheezing. Her symptoms wax and wane. Last Thursday her symptoms became even worse. She started with body aches and pains as well. She is extremely SOB and is not getting relief from her inhalers or even the nebulizer.  She has been dealing with cough, congestion, chills, sinus pressure and congestion. She has gotten progressively worse. She reports cough with yellow to dk green/brown phlegm, chest congestion, shortness of breath with any exertion. She is breathy with conversation. She reports wheezing, heaviness and tightness in her chest and poor sleep due to cough.  She is currently using her rescue inhaler and Symbicort and added on neb tx's BID.\par \par She was enrolled to start Nucala but the patient never followed up. \par Her insurance has also since changed.\par She reports she stayed on nasal sprays and GERD medication.\par She feels like her Symbicort is not working anymore and would like to try other medication.

## 2020-01-16 ENCOUNTER — FORM ENCOUNTER (OUTPATIENT)
Age: 45
End: 2020-01-16

## 2020-01-17 ENCOUNTER — APPOINTMENT (OUTPATIENT)
Dept: PULMONOLOGY | Facility: CLINIC | Age: 45
End: 2020-01-17

## 2020-01-17 VITALS
HEIGHT: 60 IN | HEART RATE: 65 BPM | WEIGHT: 120 LBS | RESPIRATION RATE: 17 BRPM | SYSTOLIC BLOOD PRESSURE: 110 MMHG | DIASTOLIC BLOOD PRESSURE: 82 MMHG | BODY MASS INDEX: 23.56 KG/M2 | OXYGEN SATURATION: 98 %

## 2020-01-21 ENCOUNTER — FORM ENCOUNTER (OUTPATIENT)
Age: 45
End: 2020-01-21

## 2020-03-11 DIAGNOSIS — J32.9 CHRONIC SINUSITIS, UNSPECIFIED: ICD-10-CM

## 2020-03-11 DIAGNOSIS — J42 UNSPECIFIED CHRONIC BRONCHITIS: ICD-10-CM

## 2020-03-11 RX ORDER — TIOTROPIUM BROMIDE INHALATION SPRAY 3.12 UG/1
2.5 SPRAY, METERED RESPIRATORY (INHALATION)
Qty: 1 | Refills: 3 | Status: ACTIVE | COMMUNITY
Start: 2020-03-11 | End: 1900-01-01

## 2020-03-11 RX ORDER — BUDESONIDE 0.5 MG/2ML
0.5 INHALANT ORAL TWICE DAILY
Qty: 2 | Refills: 3 | Status: ACTIVE | COMMUNITY
Start: 2020-03-11 | End: 1900-01-01

## 2020-03-17 ENCOUNTER — APPOINTMENT (OUTPATIENT)
Dept: PULMONOLOGY | Facility: CLINIC | Age: 45
End: 2020-03-17
Payer: COMMERCIAL

## 2020-03-17 VITALS
DIASTOLIC BLOOD PRESSURE: 90 MMHG | HEIGHT: 59 IN | OXYGEN SATURATION: 96 % | SYSTOLIC BLOOD PRESSURE: 140 MMHG | RESPIRATION RATE: 17 BRPM | TEMPERATURE: 98 F | BODY MASS INDEX: 24.19 KG/M2 | HEART RATE: 84 BPM | WEIGHT: 120 LBS

## 2020-03-17 PROCEDURE — 71046 X-RAY EXAM CHEST 2 VIEWS: CPT

## 2020-03-17 PROCEDURE — 99214 OFFICE O/P EST MOD 30 MIN: CPT | Mod: 25

## 2020-03-17 RX ORDER — PREDNISONE 10 MG/1
10 TABLET ORAL
Qty: 50 | Refills: 0 | Status: ACTIVE | COMMUNITY
Start: 2020-03-17 | End: 1900-01-01

## 2020-03-17 RX ORDER — AZELASTINE HYDROCHLORIDE 205.5 UG/1
0.15 SPRAY, METERED NASAL TWICE DAILY
Qty: 3 | Refills: 3 | Status: ACTIVE | COMMUNITY
Start: 2020-03-17 | End: 1900-01-01

## 2020-03-17 RX ORDER — LEVALBUTEROL HYDROCHLORIDE 0.63 MG/3ML
0.63 SOLUTION RESPIRATORY (INHALATION)
Qty: 120 | Refills: 1 | Status: ACTIVE | COMMUNITY
Start: 2020-03-17 | End: 1900-01-01

## 2020-03-17 NOTE — REVIEW OF SYSTEMS
[Negative] : Endocrine [EDS] : EDS [Nasal Congestion] : nasal congestion [Cough] : cough [Chest Tightness] : chest tightness [Sputum] : sputum [Dyspnea] : dyspnea [Wheezing] : wheezing [GERD] : gerd [Back Pain] : back pain [Chest Discomfort] : no chest discomfort [Diarrhea] : no diarrhea [Constipation] : no constipation [Dysphagia] : no dysphagia

## 2020-03-17 NOTE — PHYSICAL EXAM

## 2020-03-17 NOTE — ADDENDUM
[FreeTextEntry1] : Documented by Everton Kuhn acting as a scribe for Dr. Jose Antonio Coe on 03/17/2020.\par \par All medical record entries made by the Scribe were at my, Dr. Jose Antonio Coe's, direction and personally dictated by me on 03/17/2020. I have reviewed the chart and agree that the record accurately reflects my personal performance of the history, physical exam, assessment and plan. I have also personally directed, reviewed, and agree with the discharge instructions. \par

## 2020-03-17 NOTE — ASSESSMENT
[FreeTextEntry1] : Ms. Mortensen is a 45 year old female currently symptomatic of eosinophilic/ allergic asthma, allergy, and self-induced GERD. She is generally non-compliant with her treatment protocol, s/p influenza - active asthma / allergy / GERD.\par \par Her shortness of breath is multifactorial due to: \par -out of shape\par -poor breathing mechanics\par -asthma\par \par Problem 1A: Acute Bronchitis\par -Add Zithromax 500 mg for 7 days \par You have a clinical scenario most c/q acute bronchitis the etiology of which is unknown but empiric antibiotics are indicated. Hydration, mucolytics including mucinex, robitussin and the like are indicated. Cough controlling agents will be needed. \par \par problem 1: eosinophilic asthma/severe persistent asthma (active)\par -Add a short course of Prednisone; 20 mg for 7 days, then 10 mg for 7 days\par Information sheet given to the patient to be reviewed, this medication is never to be used without consulting the prescribing physician. Proper dietary restraint is necessary specifically salt containing foods, if any reaction may occur should be reported. \par \par -continue Symbicort 160 2 inhalations BID\par -continue Tudorza 1 inhalation BID\par -add Albuterol nebulizer at least BID up to QiD (gargle and spit after use) \par -recheck results of recent CBC--patient potentially a candidate for Fasenra\par -add Xopenex (0.63) TID by nebulizer, PRN  (gargle and rinse after use)\par -add Pulmicort (Budesonide) (0.5) BID by nebulizer, PRN  (gargle and rinse after use)\par  \par -Asthma is  believed to be caused by inherited (genetic) and environmental factor, but its exact cause is unknown. Asthma may be triggered by allergens, lung infections, or irritants in the air. Asthma triggers are different for each person\par \par problem 2: allergies/sinus\par - Recommended the Navage \par -Add Flonase 1 sniff each nostril BID \par -Astelin (.15) 1 sniff each nostril BID  \par -continue Zyrtec 10 mg QHS \par - Recheck eosinophil level, IgE level, Vitamin D level \par -Environmental measures for allergies were encouraged including mattress and pillow cover, air purifier, and environmental controls. \par \par problem 3: GERD\par -Add Pepcid 40 mg QHS \par -Things to avoid including overeating, spicy foods, tight clothing, eating within three hours of bed, this list is not all inclusive. \par -For treatment of reflux, possible options discussed including diet control, H2 blockers, PPIs, as well as coating motility agents discussed as treatment options. Timing of meals and proximity of last meal to sleep were discussed. If symptoms persist, a formal gastrointestinal evaluation is needed.\par -Rule of 2's: Avoid eating too late, too fast, too much, too spicy or within two hours of bedtime \par \par problem 4: low vitamin D\par -recommended to take Vitamin D supplements 50,000 units twice monthly \par -Low vitamin D Has been associated with asthma exacerbations and increased allergic symptoms. The goal based on recent information is maintaining levels between 50-70 and low normal is 30. Recommended 50,000 units every two weeks to once a month depending on the level. \par \par Problem 5: r/o OSAS\par -She is to have a in lab sleep study to rule out OSAS (as per her insurance) \par - Due to his / her EDS, fatigue, snoring, elevated Mallampati class, poor memory, poor concentration, and neck size, he / she is being recommended for a home sleep study. \par - Sleep apnea is associated with adverse clinical consequences which an affect most organ systems. Cardiovascular disease risk includes arrhythmias, atrial fibrillation, hypertension, coronary artery disease, and stroke. Metabolic disorders include diabetes type 2, non-alcoholic fatty liver disease. Mood disorder especially depression; and cognitive decline especially in the elderly. Associations with chronic reflux/Tuttle’s esophagus some but not all inclusive. \par -Reasons include arousal consistent with hypopnea; respiratory events most prominent in REM sleep or supine position; therefore sleep staging and body position are important for accurate diagnosis and estimation of AHI. \par \par problem 6: poor breathing mechanics\par -Proper breathing techniques were reviewed with an emphasis of exhalation. Patient instructed to breath in for 1 second and out for four seconds. Patient was encouraged to not talk while walking.\par \par problem 7: out of shape\par -Exercise and diet control were discussed and are highly encouraged. Treatment options were given such as, aqua therapy, and contacting a nutritionist. Recommended to use the elliptical, stationary bike, less use of treadmill. Mindful eating was explained to the patient. Obesity is associated with worsening asthma, shortness of breath, and potential for cardiac disease, diabetes, and other underlying medical conditions. \par \par problem 8: health maintenance \par -recommended to continute to diet and exercise, try to have an anti-inflammatory diet \par -recommended influenza vaccination\par -recommended strep pneumonia vaccines: Prevnar-13 vaccine, followed by Pneumo vaccine 23 one year following\par -recommended early intervention for URIs\par -recommended regular osteoporosis evaluations\par -recommended early dermatological evaluations\par -recommended after the age of 50 to the age of 70, colonoscopy every 5 years \par  \par \par F/u in 2 months \par pt is encouraged to call or fax the office with any questions or concerns.

## 2020-03-30 ENCOUNTER — RX RENEWAL (OUTPATIENT)
Age: 45
End: 2020-03-30

## 2020-03-30 RX ORDER — FAMOTIDINE 40 MG/1
40 TABLET, FILM COATED ORAL
Qty: 30 | Refills: 1 | Status: ACTIVE | COMMUNITY
Start: 2019-09-26 | End: 1900-01-01

## 2020-06-16 ENCOUNTER — APPOINTMENT (OUTPATIENT)
Dept: PULMONOLOGY | Facility: CLINIC | Age: 45
End: 2020-06-16
Payer: COMMERCIAL

## 2020-06-16 DIAGNOSIS — R06.83 SNORING: ICD-10-CM

## 2020-06-16 DIAGNOSIS — Z87.09 PERSONAL HISTORY OF OTHER DISEASES OF THE RESPIRATORY SYSTEM: ICD-10-CM

## 2020-06-16 PROCEDURE — 99214 OFFICE O/P EST MOD 30 MIN: CPT | Mod: 95

## 2020-06-16 RX ORDER — CICLESONIDE 160 UG/1
160 AEROSOL, METERED RESPIRATORY (INHALATION) TWICE DAILY
Qty: 3 | Refills: 1 | Status: ACTIVE | COMMUNITY
Start: 2020-06-16 | End: 1900-01-01

## 2020-06-16 RX ORDER — ZILEUTON 600 MG/1
600 TABLET, FILM COATED, EXTENDED RELEASE ORAL
Qty: 360 | Refills: 1 | Status: ACTIVE | COMMUNITY
Start: 2020-06-16 | End: 1900-01-01

## 2020-06-16 NOTE — ADDENDUM
[FreeTextEntry1] : Documented by Cori Fritz acting as a scribe for Dr. Jose Antonio Coe on (06/16/2020).\par \par All medical record entries made by the Scribe were at my, Dr. Jose Antonio Coe's, direction and personally dictated by me on (06/16/2020). I have reviewed the chart and agree that the record accurately reflects my personal performance of the history, physical exam, assessment and plan. I have also personally directed, reviewed, and agree with the discharge instructions. \par \par \par

## 2020-06-16 NOTE — PHYSICAL EXAM
[Well Nourished] : well nourished [Well Groomed] : well groomed [Normal Appearance] : normal appearance [No Acute Distress] : no acute distress [No Deformities] : no deformities [Well Developed] : well developed

## 2020-06-16 NOTE — ASSESSMENT
[FreeTextEntry1] : Ms. Mortensen is a 45 year old female currently symptomatic of eosinophilic/ allergic asthma, nasal polyps, allergy, and self-induced GERD. She is generally non-compliant with her treatment protocol, s/p influenza 1/2020 - active asthma / allergy / GERD- symptomatic.\par \par Her shortness of breath is multifactorial due to: \par -out of shape\par -poor breathing mechanics\par -asthma\par \par problem 1: severe persistent asthma (active)\par - add Alvesco 160 2 puffs BID\par -continue Symbicort 160 2 inhalations BID\par -continue Tudorza 1 inhalation BID\par -add Albuterol nebulizer at least BID up to QiD (gargle and spit after use) \par -add Xopenex (0.63) TID by nebulizer, PRN  (gargle and rinse after use)\par -add Pulmicort (Budesonide) (0.5) BID by nebulizer, PRN  (gargle and rinse after use)\par - add Xyflo 1200 BID\par -Asthma is  believed to be caused by inherited (genetic) and environmental factor, but its exact cause is unknown. Asthma may be triggered by allergens, lung infections, or irritants in the air. Asthma triggers are different for each person\par \par Problem 1A: eosinophilic Asthma \par - Candidate for Dupixent/Fasenra\par - Dupixent is a prescription medicine used with other asthma medicines for the maintenance treatment of moderate-to-severe asthma in people aged 12 years and older whose asthma is not controlled with their current asthma medicines. Dupixent helps prevent severe asthma attacks (exacerbations) and can improve your breathing. Dupixent may also help reduce the amount of oral corticosteroids you need while preventing severe asthma attacks and improving your breathing. Dupixent is not used to treat sudden breathing problems. Risks and side effect of Dupixent were discussed and reviewed with patient. \par \par problem 2: allergies/sinus\par - Recommended the Navage \par -Add Flonase 1 sniff each nostril BID \par -Astelin (.15) 1 sniff each nostril BID  \par -continue Zyrtec 10 mg QHS \par - Recheck eosinophil level, IgE level, Vitamin D level \par -Environmental measures for allergies were encouraged including mattress and pillow cover, air purifier, and environmental controls. \par \par Problem 2A: Nasal Polyps\par - Complete CT of Sinuses\par - Candidate for Dupixent\par \par problem 3: GERD\par -Add Pepcid 40 mg QHS \par -Things to avoid including overeating, spicy foods, tight clothing, eating within three hours of bed, this list is not all inclusive. \par -For treatment of reflux, possible options discussed including diet control, H2 blockers, PPIs, as well as coating motility agents discussed as treatment options. Timing of meals and proximity of last meal to sleep were discussed. If symptoms persist, a formal gastrointestinal evaluation is needed.\par -Rule of 2's: Avoid eating too late, too fast, too much, too spicy or within two hours of bedtime \par \par problem 4: low vitamin D\par -recommended to take Vitamin D supplements 50,000 units twice monthly \par -Low vitamin D Has been associated with asthma exacerbations and increased allergic symptoms. The goal based on recent information is maintaining levels between 50-70 and low normal is 30. Recommended 50,000 units every two weeks to once a month depending on the level. \par \par Problem 5: r/o OSAS\par -She is to have a in lab sleep study to rule out OSAS (as per her insurance) \par - Due to his / her EDS, fatigue, snoring, elevated Mallampati class, poor memory, poor concentration, and neck size, he / she is being recommended for a home sleep study. \par - Sleep apnea is associated with adverse clinical consequences which an affect most organ systems. Cardiovascular disease risk includes arrhythmias, atrial fibrillation, hypertension, coronary artery disease, and stroke. Metabolic disorders include diabetes type 2, non-alcoholic fatty liver disease. Mood disorder especially depression; and cognitive decline especially in the elderly. Associations with chronic reflux/Tuttle’s esophagus some but not all inclusive. \par -Reasons include arousal consistent with hypopnea; respiratory events most prominent in REM sleep or supine position; therefore sleep staging and body position are important for accurate diagnosis and estimation of AHI. \par \par problem 6: poor breathing mechanics\par -Proper breathing techniques were reviewed with an emphasis of exhalation. Patient instructed to breath in for 1 second and out for four seconds. Patient was encouraged to not talk while walking.\par \par problem 7: out of shape\par -Exercise and diet control were discussed and are highly encouraged. Treatment options were given such as, aqua therapy, and contacting a nutritionist. Recommended to use the elliptical, stationary bike, less use of treadmill. Mindful eating was explained to the patient. Obesity is associated with worsening asthma, shortness of breath, and potential for cardiac disease, diabetes, and other underlying medical conditions. \par \par problem 8: health maintenance \par -recommended to continute to diet and exercise, try to have an anti-inflammatory diet \par -recommended influenza vaccination\par -recommended strep pneumonia vaccines: Prevnar-13 vaccine, followed by Pneumo vaccine 23 one year following\par -recommended early intervention for URIs\par -recommended regular osteoporosis evaluations\par -recommended early dermatological evaluations\par -recommended after the age of 50 to the age of 70, colonoscopy every 5 years \par  \par \par F/u in 2 months \par pt is encouraged to call or fax the office with any questions or concerns.

## 2020-06-16 NOTE — REASON FOR VISIT
[Follow-Up] : a follow-up visit [FreeTextEntry1] : video call-allergic rhinitis, asthma, cough, GERD, low vitamin D, post-nasal drip, sinus issues, snoring and shortness of breath

## 2020-06-16 NOTE — HISTORY OF PRESENT ILLNESS
[Home] : at home, [unfilled] , at the time of the visit. [Verbal consent obtained from patient] : the patient, [unfilled] [Medical Office: ([facility])___] : at the medical office located in  [FreeTextEntry1] : Ms. Mortensen is a 45 year old female presenting to the office via video call for a follow up visit for allergic rhinitis, asthma, cough, GERD, low vitamin D, post-nasal drip, sinus issues, snoring and shortness of breath. Her chief complaint is\par - She is very busy at work \par - She is not feeling great \par - She has been having chest congestion \par - She is using all her medications \par - She is having difficulty breathing\par - Sometimes it is so bad she cant speak \par - She has pain on the left side of her rib \par - She is bringing up mucus (dark yellow)\par - She gets sweats \par - Her sleep is good if she is not coughing \par - She has been stretching for exercise \par -she denies any headaches, nausea, vomiting, fever, chills, chest pain, chest pressure, diarrhea, constipation, dysphagia, dizziness, sour taste in the mouth, leg swelling, leg pain, itchy eyes, itchy ears, heartburn, reflux, myalgias or arthralgias.\par

## 2020-06-16 NOTE — REVIEW OF SYSTEMS
[Negative] : Endocrine [Cough] : cough [Chest Tightness] : chest tightness [Sputum] : sputum [Dyspnea] : dyspnea [SOB on Exertion] : sob on exertion

## 2020-08-10 DIAGNOSIS — Z01.812 ENCOUNTER FOR PREPROCEDURAL LABORATORY EXAMINATION: ICD-10-CM

## 2020-09-08 ENCOUNTER — APPOINTMENT (OUTPATIENT)
Dept: PULMONOLOGY | Facility: CLINIC | Age: 45
End: 2020-09-08

## 2021-03-01 ENCOUNTER — RX RENEWAL (OUTPATIENT)
Age: 46
End: 2021-03-01

## 2021-03-01 RX ORDER — BUDESONIDE AND FORMOTEROL FUMARATE DIHYDRATE 160; 4.5 UG/1; UG/1
160-4.5 AEROSOL RESPIRATORY (INHALATION) TWICE DAILY
Qty: 1 | Refills: 3 | Status: ACTIVE | COMMUNITY
Start: 2020-03-11 | End: 1900-01-01

## 2021-03-17 ENCOUNTER — APPOINTMENT (OUTPATIENT)
Dept: PULMONOLOGY | Facility: CLINIC | Age: 46
End: 2021-03-17
Payer: COMMERCIAL

## 2021-03-17 VITALS — HEIGHT: 61 IN | BODY MASS INDEX: 23.6 KG/M2 | WEIGHT: 125 LBS

## 2021-03-17 PROCEDURE — 99214 OFFICE O/P EST MOD 30 MIN: CPT | Mod: 95

## 2021-03-17 RX ORDER — AZITHROMYCIN 500 MG/1
500 TABLET, FILM COATED ORAL DAILY
Qty: 7 | Refills: 0 | Status: DISCONTINUED | COMMUNITY
Start: 2020-03-17 | End: 2021-03-17

## 2021-03-17 RX ORDER — FLUTICASONE PROPIONATE 93 UG/1
93 SPRAY, METERED NASAL
Qty: 3 | Refills: 1 | Status: ACTIVE | COMMUNITY
Start: 2021-03-17 | End: 1900-01-01

## 2021-03-17 NOTE — ASSESSMENT
[FreeTextEntry1] : Ms. Mortensen is a 46 year old female currently symptomatic of eosinophilic/ allergic asthma, nasal polyps, allergy, and self-induced GERD. She is generally non-compliant with her treatment protocol, s/p influenza 1/2020 - active asthma / allergy / GERD- symptomatic. Self Rx Prednisone. \par \par Her shortness of breath is multifactorial due to: \par -out of shape\par -poor breathing mechanics\par -asthma\par \par problem 1: severe persistent asthma (active)\par -continue Symbicort 160 2 inhalations BID\par -discontinue Tudorza 1 inhalation BID\par -discontinue Albuterol nebulizer at least BID up to QiD (gargle and spit after use) \par -transition to Breztri 2 inhalations BID \par -continue Xopenex (0.63) TID by nebulizer, PRN  (gargle and rinse after use)\par -continue Pulmicort (Budesonide) (0.5) BID by nebulizer, PRN  (gargle and rinse after use)\par - continue Xyflo 1200 BID\par -Asthma is  believed to be caused by inherited (genetic) and environmental factor, but its exact cause is unknown. Asthma may be triggered by allergens, lung infections, or irritants in the air. Asthma triggers are different for each person\par \par Problem 1A: eosinophilic Asthma (re d/w patient 3/17/2021) \par - Candidate for Dupixent/Fasenra\par - Dupixent is a prescription medicine used with other asthma medicines for the maintenance treatment of moderate-to-severe asthma in people aged 12 years and older whose asthma is not controlled with their current asthma medicines. Dupixent helps prevent severe asthma attacks (exacerbations) and can improve your breathing. Dupixent may also help reduce the amount of oral corticosteroids you need while preventing severe asthma attacks and improving your breathing. Dupixent is not used to treat sudden breathing problems. Risks and side effect of Dupixent were discussed and reviewed with patient. \par \par problem 2: allergies/sinus\par -get Blood work to include: asthma panel, food IgE panel, IgE level, eosinophil level, vitamin D level \par - Recommended the Navage \par -continue Flonase 1 sniff each nostril BID \par -Add Xhance 1 sniff BID \par -continue Astelin (.15) 1 sniff each nostril BID  \par -continue Zyrtec 10 mg QHS \par - Recheck eosinophil level, IgE level, Vitamin D level \par -Environmental measures for allergies were encouraged including mattress and pillow cover, air purifier, and environmental controls. \par \par Problem 2A: Nasal Polyps\par - Complete CT of Sinuses\par - Candidate for Dupixent\par \par problem 3: GERD\par -continue Pepcid 40 mg QHS \par -Things to avoid including overeating, spicy foods, tight clothing, eating within three hours of bed, this list is not all inclusive. \par -For treatment of reflux, possible options discussed including diet control, H2 blockers, PPIs, as well as coating motility agents discussed as treatment options. Timing of meals and proximity of last meal to sleep were discussed. If symptoms persist, a formal gastrointestinal evaluation is needed.\par -Rule of 2's: Avoid eating too late, too fast, too much, too spicy or within two hours of bedtime \par \par problem 4: low vitamin D\par -recommended to take Vitamin D supplements 50,000 units twice monthly \par -Low vitamin D Has been associated with asthma exacerbations and increased allergic symptoms. The goal based on recent information is maintaining levels between 50-70 and low normal is 30. Recommended 50,000 units every two weeks to once a month depending on the level. \par \par Problem 5: r/o OSAS\par -She is to have a in lab sleep study to rule out OSAS (as per her insurance) (re-stressed) \par - Due to his / her EDS, fatigue, snoring, elevated Mallampati class, poor memory, poor concentration, and neck size, he / she is being recommended for a home sleep study. \par - Sleep apnea is associated with adverse clinical consequences which an affect most organ systems. Cardiovascular disease risk includes arrhythmias, atrial fibrillation, hypertension, coronary artery disease, and stroke. Metabolic disorders include diabetes type 2, non-alcoholic fatty liver disease. Mood disorder especially depression; and cognitive decline especially in the elderly. Associations with chronic reflux/Tuttle’s esophagus some but not all inclusive. \par -Reasons include arousal consistent with hypopnea; respiratory events most prominent in REM sleep or supine position; therefore sleep staging and body position are important for accurate diagnosis and estimation of AHI. \par \par problem 6: poor breathing mechanics\par -Proper breathing techniques were reviewed with an emphasis of exhalation. Patient instructed to breath in for 1 second and out for four seconds. Patient was encouraged to not talk while walking.\par \par problem 7: out of shape\par -Exercise and diet control were discussed and are highly encouraged. Treatment options were given such as, aqua therapy, and contacting a nutritionist. Recommended to use the elliptical, stationary bike, less use of treadmill. Mindful eating was explained to the patient. Obesity is associated with worsening asthma, shortness of breath, and potential for cardiac disease, diabetes, and other underlying medical conditions. \par \par problem 8: health maintenance \par -educated patient on COVID 19 vaccine and appropriate recommendations (Pt. has COVID fears) \par -recommended to continue to diet and exercise, try to have an anti-inflammatory diet \par -recommended influenza vaccination\par -recommended strep pneumonia vaccines: Prevnar-13 vaccine, followed by Pneumo vaccine 23 one year following\par -recommended early intervention for URIs\par -recommended regular osteoporosis evaluations\par -recommended early dermatological evaluations\par -recommended after the age of 50 to the age of 70, colonoscopy every 5 years \par  \par \par F/u in 2 months \par pt is encouraged to call or fax the office with any questions or concerns.

## 2021-03-17 NOTE — ADDENDUM
[FreeTextEntry1] : Documented by Marcel Gillespie acting as a scribe for Dr. Jose Antonio Coe on 03/17/2021.\par \par All medical record entries made by the Scribe were at my, Dr. Jose Antonio Coe's, direction and personally dictated by me on 03/17/2021 . I have reviewed the chart and agree that the record accurately reflects my personal performance of the history, physical exam, assessment and plan. I have also personally directed, reviewed, and agree with the discharge instructions. \par

## 2021-03-17 NOTE — HISTORY OF PRESENT ILLNESS
[Home] : at home, [unfilled] , at the time of the visit. [Medical Office: ([facility])___] : at the medical office located in  [Verbal consent obtained from patient] : the patient, [unfilled] [FreeTextEntry1] : Ms. Mortensen is a 46 year old female presenting to the office via video call for a follow up visit for allergic rhinitis, asthma, cough, GERD, low vitamin D, post-nasal drip, sinus issues, snoring and shortness of breath. Her chief complaint is\par \par -she notes generally feeling well and improved after Prednisone x 1 week 2/24/2021\par -she notes moderate nasal congestion\par -she notes increased mucus production originating in the chest with residual chest pressure controlled with Zyrtec and Zyrtec D\par -she notes intermittent SOB residual to increased mucus production alleviated with Symbicort\par -she notes intermittent dysphagia worse with solids\par -she notes active heartburn\par -she notes intermittent reflux\par -she notes mild weight increase exacerbated by decreased physical activity during cold weather\par -she notes mild cough\par -she notes intermittent wheeze worse when off prednisone 3/1/2021\par -she notes sleep quality stable\par -she notes intermittent fatigue\par -she notes unsure if snores\par -she notes waking fatigued\par -she notes her memory and concentration are poor\par -she notes anxiety exacerbated by concerns for COVID 19 vaccine and potential residual Sx\par \par -denies any chest pain, chest pressure, diarrhea, constipation, sour taste in the mouth, dizziness, leg swelling, leg pain, myalgias, arthralgias, itchy eyes, itchy ears.\par \par

## 2021-05-22 ENCOUNTER — LABORATORY RESULT (OUTPATIENT)
Age: 46
End: 2021-05-22

## 2021-05-22 LAB
24R-OH-CALCIDIOL SERPL-MCNC: 59.2 PG/ML
25(OH)D3 SERPL-MCNC: 23.9 NG/ML
BASOPHILS # BLD AUTO: 0.11 K/UL
BASOPHILS NFR BLD AUTO: 1.4 %
EOSINOPHIL # BLD AUTO: 1.71 K/UL
EOSINOPHIL NFR BLD AUTO: 21.1 %
HCT VFR BLD CALC: 32.6 %
HGB BLD-MCNC: 9.2 G/DL
IMM GRANULOCYTES NFR BLD AUTO: 0.1 %
LYMPHOCYTES # BLD AUTO: 2.65 K/UL
LYMPHOCYTES NFR BLD AUTO: 32.6 %
MAN DIFF?: NORMAL
MCHC RBC-ENTMCNC: 20.8 PG
MCHC RBC-ENTMCNC: 28.2 GM/DL
MCV RBC AUTO: 73.8 FL
MONOCYTES # BLD AUTO: 0.51 K/UL
MONOCYTES NFR BLD AUTO: 6.3 %
NEUTROPHILS # BLD AUTO: 3.13 K/UL
NEUTROPHILS NFR BLD AUTO: 38.5 %
PLATELET # BLD AUTO: 452 K/UL
RBC # BLD: 4.42 M/UL
RBC # FLD: 17.2 %
WBC # FLD AUTO: 8.12 K/UL

## 2021-05-25 LAB — TOTAL IGE SMQN RAST: 94 KU/L

## 2021-05-26 ENCOUNTER — RX RENEWAL (OUTPATIENT)
Age: 46
End: 2021-05-26

## 2021-06-09 ENCOUNTER — NON-APPOINTMENT (OUTPATIENT)
Age: 46
End: 2021-06-09

## 2021-08-19 NOTE — ED PROVIDER NOTE - NS ED MD DISPO DISCHARGE
Assessment & Plan     Vaginal symptom  Comment: Once again, wet prep is negative. Recommend seeing OB/GYN given symptoms despite negative labs. It would probably be a good idea to see them for lack of withdrawal bleed on OCP (change from her normal), as well.   - Wet prep - Clinic Collect    Encounter for birth control pills maintenance  Comment: Pregnancy test negative. Plan to start OCP Sunday. Recommended seeing OB/GYN for lack of withdrawal bleed despite placebo week of pills. This may be related to vaginitis symptoms.   - HCG Qual, Urine ([id number])  - levonorgestrel-ethinyl estradiol (SRONYX) 0.1-20 MG-MCG tablet  Dispense: 84 tablet; Refill: 3    Dyshidrotic foot dermatitis  Comment: Will intensify topical steroid strength. Use twice a day for up to 2 weeks at a time.   - triamcinolone (KENALOG) 0.5 % external ointment  Dispense: 15 g; Refill: 3    See Patient Instructions    Return in about 4 weeks (around 9/16/2021) for persistent or worsening symptoms.    Charbel Spencer NP  [facility] 30 year [facility] who presents for the following health issues     HPI     Medication Followup of birth control     Taking Medication as prescribed: yes    Side Effects:  None    Medication Helping Symptoms:  yes     Vaginal Symptoms  Onset/Duration: x 2 weeks   Description:  Vaginal Discharge: white   Itching (Pruritis): YES- sometimes   Burning sensation:  no  Odor: YES  Accompanying Signs & Symptoms:  Urinary symptoms: no  Abdominal pain: no  Fever: no  History:   Sexually active: YES- pain intercourse   New Partner: no  Possibility of Pregnancy:  no  Recent antibiotic use: no  Previous vaginitis issues: YES- yeast and BV   Precipitating or alleviating factors: None  Therapies tried and outcome: none      Concern - Rash/eczema   Onset: x ongoing -has seen Igor 6/23/2021  Description: right foot , eczema       HPI:     Follow up of last visit with vaginal symptoms and  "rash.     1. Had a rash on her lateral right foot in June. Red, raised bumps. Tried OTC anti-fungal which helped only mildly. Came to the clinic and got steroid cream. It persisted despite this. At present, it is still there but has faded considerably.     2. Has a history of frequent yeast infection and BV. Was tested in June due to symptoms but was found to be negative. States that she is still having symptoms but that they are milder than during that visit.     3. Refill of OCP. Has been on OCP for quite some time. Curiously, about a year ago, she quit having withdrawal bleeds during her placebo week. It was recommended to her in June to stop her pill for a month. She did have period that month and is due for a period at present. Has had unprotected intercourse since then. Non smoker. No migraine with aura. No HTN.     Review of Systems   Constitutional, , skin systems are negative, except as otherwise noted.      Objective    /72   Pulse 82   Temp 99.1  F (37.3  C) (Tympanic)   Ht 1.626 m (5' 4\")   Wt 63 kg (139 lb)   SpO2 100%   BMI 23.86 kg/m    Body mass index is 23.86 kg/m .  Physical Exam   GENERAL: healthy, alert and no distress  SKIN: Faded, raised, pink papules arranged in a cluster over lateral right foot. Appearance consistent with mild dyshidrotic eczema.   NEURO: Normal strength and tone, mentation intact and speech normal  PSYCH: mentation appears normal, affect normal/bright    Results for orders placed or performed in visit on 08/19/21 (from the past 24 hour(s))   Wet prep - Clinic Collect    Specimen: Vagina; Swab   Result Value Ref Range    Trichomonas Absent Absent    Yeast Absent Absent    Clue Cells Absent Absent    WBCs/high power field None None   HCG Qual, Urine ([id number])   Result Value Ref Range    hCG Urine Qualitative Negative Negative               " Home

## 2021-08-25 ENCOUNTER — APPOINTMENT (OUTPATIENT)
Dept: PULMONOLOGY | Facility: CLINIC | Age: 46
End: 2021-08-25

## 2021-10-04 ENCOUNTER — FORM ENCOUNTER (OUTPATIENT)
Age: 46
End: 2021-10-04

## 2021-10-07 DIAGNOSIS — Z83.3 FAMILY HISTORY OF DIABETES MELLITUS: ICD-10-CM

## 2021-10-07 DIAGNOSIS — Z87.09 PERSONAL HISTORY OF OTHER DISEASES OF THE RESPIRATORY SYSTEM: ICD-10-CM

## 2021-10-07 DIAGNOSIS — Z78.9 OTHER SPECIFIED HEALTH STATUS: ICD-10-CM

## 2021-10-07 DIAGNOSIS — Z80.3 FAMILY HISTORY OF MALIGNANT NEOPLASM OF BREAST: ICD-10-CM

## 2021-10-07 DIAGNOSIS — Z33.2 ENCOUNTER FOR ELECTIVE TERMINATION OF PREGNANCY: ICD-10-CM

## 2021-10-07 DIAGNOSIS — Z87.42 PERSONAL HISTORY OF OTHER DISEASES OF THE FEMALE GENITAL TRACT: ICD-10-CM

## 2021-10-07 DIAGNOSIS — O02.1 MISSED ABORTION: ICD-10-CM

## 2021-11-03 ENCOUNTER — APPOINTMENT (OUTPATIENT)
Dept: OBGYN | Facility: CLINIC | Age: 46
End: 2021-11-03
Payer: COMMERCIAL

## 2021-11-03 VITALS
BODY MASS INDEX: 21.71 KG/M2 | DIASTOLIC BLOOD PRESSURE: 85 MMHG | OXYGEN SATURATION: 97 % | RESPIRATION RATE: 17 BRPM | SYSTOLIC BLOOD PRESSURE: 128 MMHG | HEART RATE: 82 BPM | HEIGHT: 61 IN | WEIGHT: 115 LBS

## 2021-11-03 DIAGNOSIS — Z12.39 ENCOUNTER FOR OTHER SCREENING FOR MALIGNANT NEOPLASM OF BREAST: ICD-10-CM

## 2021-11-03 DIAGNOSIS — T78.1XXA OTHER ADVERSE FOOD REACTIONS, NOT ELSEWHERE CLASSIFIED, INITIAL ENCOUNTER: ICD-10-CM

## 2021-11-03 DIAGNOSIS — N64.52 NIPPLE DISCHARGE: ICD-10-CM

## 2021-11-03 DIAGNOSIS — N64.4 MASTODYNIA: ICD-10-CM

## 2021-11-03 PROCEDURE — 99212 OFFICE O/P EST SF 10 MIN: CPT

## 2021-11-04 NOTE — HISTORY OF PRESENT ILLNESS
[Diffused Pain] : diffused pain [Nipple Discharge] : nipple discharge [Persistent] : persistent [Mild] : mild [FreeTextEntry1] : 47 y/o   presents for urgent GYN exam-left breast discomfort "tenderness" to touch and when not wearing bra for support. reports scant "puss like" nipple discharge on expression left breast. no skin changes, lumps/bumps or lymphadenopathy. no n/v/f/c or weight loss. breast symptoms for the past 6 weeks. \par \par MGM + breast CA; pt has no hx of genetic CA screening\par \par no recent changes in general health\par \par taking zyrtec and prn inhaler for asthma, and prilosec. \par \par last mammo was 2019 or 2020, pt unsure [___ Weeks] : [unfilled] weeks [TextBox_17] : left breast more tender than right but both are sensitive [TextBox_34] : left breast discomfort 6 weeks

## 2021-11-18 ENCOUNTER — NON-APPOINTMENT (OUTPATIENT)
Age: 46
End: 2021-11-18

## 2021-12-30 ENCOUNTER — APPOINTMENT (OUTPATIENT)
Dept: PULMONOLOGY | Facility: CLINIC | Age: 46
End: 2021-12-30
Payer: COMMERCIAL

## 2021-12-30 VITALS — HEIGHT: 61 IN | BODY MASS INDEX: 21.52 KG/M2 | WEIGHT: 114 LBS

## 2021-12-30 DIAGNOSIS — R05.3 CHRONIC COUGH: ICD-10-CM

## 2021-12-30 PROCEDURE — 99214 OFFICE O/P EST MOD 30 MIN: CPT | Mod: 95

## 2021-12-30 NOTE — ADDENDUM
[FreeTextEntry1] : Documented by Tuan Ferguson acting as a scribe for Dr. Jose Antonio Coe on 12/30/2021 .\par \par All medical record entries made by the Scribe were at my, Dr. Jose Antonio Coe's, direction and personally dictated by me on. I have reviewed the chart and agree that the record accurately reflects my personal performance of the history, physical exam, assessment and plan. I have also personally directed, reviewed, and agree with the discharge instructions.

## 2021-12-30 NOTE — HISTORY OF PRESENT ILLNESS
[Home] : at home, [unfilled] , at the time of the visit. [Medical Office: ([facility])___] : at the medical office located in  [Verbal consent obtained from patient] : the patient, [unfilled] [FreeTextEntry1] : Ms. Mortensen is a 46 year old female presenting to the office via video call for a follow up visit for allergic rhinitis, asthma, cough, GERD, low vitamin D, post-nasal drip, sinus issues, snoring and shortness of breath. Her chief complaint is\par \par -she notes testing positive a couple of days ago \par -she notes getting surgery november 17th\par -she notes coughing \par -she denies heartburn and reflux \par -she notes trying to control the diet \par -she notes losing some weight (114 lbs) \par -she notes cutting out fried foods\par -she notes having muscular joints and pains \par -she notes taking vitamin D and C \par -she notes vaccinated x2 \par denies any headaches, nausea, vomiting, fever, chills, sweats, chest pain, chest pressure, diarrhea, constipation, dysphagia, dizziness, leg swelling, leg pain, itchy eyes, itchy ears, heartburn, reflux, or sour taste in the mouth.

## 2021-12-30 NOTE — ASSESSMENT
[FreeTextEntry1] : Ms. Mortensen is a 46 year old female currently symptomatic of eosinophilic/ allergic asthma, nasal polyps, allergy, and self-induced GERD. She is generally non-compliant with her treatment protocol, s/p influenza 1/2020 - active asthma / allergy / GERD- symptomatic. Self Rx Prednisone.- now sp nasal polypectomy (quidis( 11/2021- now w/ covid-19 infection \par \par Her shortness of breath is multifactorial due to: \par -out of shape\par -poor breathing mechanics\par -asthma\par \par problem 1: severe persistent asthma (active)\par -continue Symbicort 160 2 inhalations BID\par -discontinue Tudorza 1 inhalation BID\par -discontinue Albuterol nebulizer at least BID up to QiD (gargle and spit after use) \par -transition to Breztri 2 inhalations BID \par -continue Xopenex (0.63) TID by nebulizer, PRN  (gargle and rinse after use)\par -continue Pulmicort (Budesonide) (0.5) BID by nebulizer, PRN  (gargle and rinse after use)\par - continue Xyflo 1200 BID\par -Asthma is  believed to be caused by inherited (genetic) and environmental factor, but its exact cause is unknown. Asthma may be triggered by allergens, lung infections, or irritants in the air. Asthma triggers are different for each person\par \par Problem 1A: Covid-19 infection \par Problem: Health Maintenance/COVID19 Precautions \par -Recommend quarantine for 10 days \par -alkaseltzer\par  -add Prednisone 20 mg x 7 days, 10 mg x 7 days \par Information sheet given to the patient to be reviewed, this medication is never to be used without consulting the prescribing physician. Proper dietary restraint is necessary specifically salt containing foods, if any reaction may occur should be reported. \par - Clean your hands often. Wash your hands often with soap and water for at least 20 seconds, especially after blowing your nose, coughing, or sneezing, or having been in a public place.\par - If soap and water are not available, use a hand  that contains at least 60% alcohol.\par - To the extent possible, avoid touching high-touch surfaces in public places - elevator buttons, door handles, handrails, handshaking with people, etc. Use a tissue or your sleeve to cover your hand or finger if you must touch something.\par - Wash your hands after touching surfaces in public places.\par - Avoid touching your face, nose, eyes, etc.\par - Clean and disinfect your home to remove germs: practice routine cleaning of frequently touched surfaces (for example: tables, doorknobs, light switches, handles, desks, toilets, faucets, sinks & cell phones)\par - Avoid crowds, especially in poorly ventilated spaces. Your risk of exposure to respiratory viruses like COVID-19 may increase in crowded, closed-in settings with little air circulation if there are people in the crowd who are sick. All patients are recommended to practice social distancing and stay at least 6 feet away from others. \par - Avoid all non-essential travel including plane trips, and especially avoid embarking on cruise ships.\par -If COVID-19 is spreading in your community, take extra measures to put distance between yourself and other people to further reduce your risk of being exposed to this new virus.\par -Stay home as much as possible.\par - Consider ways of getting food brought to your house through family, social, or commercial networks\par -Be aware that the virus has been known to live in the air up to 3 hours post exposure, cardboard up to 24 hours post exposure, copper up to 4 hours post exposure, steel and plastic up to 2-3 days post exposure. Risk of transmission from these surfaces are affected by many variables.\par COVID-19 precautionary Immune Support Recommendations:\par -OTC Vitamin C 500mg BID \par -OTC Quercetin 250-500mg BID \par -OTC Zinc 75-100mg per day \par -OTC Melatonin 1 or 2mg a night \par -OTC Vitamin D 1-4000mg per day \par -OTC Tonic Water 8oz per day\par -Water 0.5-1 gallon per day\par Asthma and COVID19:\par You need to make sure your asthma is under control. This often requires the use of inhaled corticosteroids (and sometimes oral corticosteroids). Inhaled corticosteroids do not likely reduce your immune system’s ability to fight infections, but oral corticosteroids may. It is important to use the steps above to protect yourself to limit your exposure to any respiratory virus. \par \par Problem 1B: eosinophilic Asthma (re d/w patient 3/17/2021) ; 12/2021 \par - Candidate for Dupixent/Fasenra\par - Dupixent is a prescription medicine used with other asthma medicines for the maintenance treatment of moderate-to-severe asthma in people aged 12 years and older whose asthma is not controlled with their current asthma medicines. Dupixent helps prevent severe asthma attacks (exacerbations) and can improve your breathing. Dupixent may also help reduce the amount of oral corticosteroids you need while preventing severe asthma attacks and improving your breathing. Dupixent is not used to treat sudden breathing problems. Risks and side effect of Dupixent were discussed and reviewed with patient. \par \par problem 2: allergies/sinus\par -get Blood work to include: asthma panel, food IgE panel, IgE level, eosinophil level, vitamin D level \par - Recommended the Navage \par -continue Flonase 1 sniff each nostril BID \par -On Xhance BID \par -continue Astelin (.15) 1 sniff each nostril BID  \par -continue Zyrtec 10 mg QHS \par - Recheck eosinophil level, IgE level, Vitamin D level \par -Environmental measures for allergies were encouraged including mattress and pillow cover, air purifier, and environmental controls. \par \par Problem 2A: Nasal Polyps\par -s/p polypectomy \par - Complete CT of Sinuses\par - Candidate for Dupixent (hesitant) \par Dupixent is a prescription medicine used with other asthma medicines for the maintenance treatment of moderate-to-severe asthma in people aged 12 years and older whose asthma is not controlled with their current asthma medicines. Dupixent helps prevent severe asthma attacks (exacerbations) and can improve your breathing. Dupixent may also help reduce the amount of oral corticosteroids you need while preventing severe asthma attacks and improving your breathing. Dupixent is not used to treat sudden breathing problems. Risks and side effect of Dupixent were discussed and reviewed with patient. \par \par problem 3: GERD\par -continue Pepcid 40 mg QHS \par -Things to avoid including overeating, spicy foods, tight clothing, eating within three hours of bed, this list is not all inclusive. \par -For treatment of reflux, possible options discussed including diet control, H2 blockers, PPIs, as well as coating motility agents discussed as treatment options. Timing of meals and proximity of last meal to sleep were discussed. If symptoms persist, a formal gastrointestinal evaluation is needed.\par -Rule of 2's: Avoid eating too late, too fast, too much, too spicy or within two hours of bedtime \par \par problem 4: low vitamin D\par -recommended to take Vitamin D supplements 50,000 units twice monthly \par -Low vitamin D Has been associated with asthma exacerbations and increased allergic symptoms. The goal based on recent information is maintaining levels between 50-70 and low normal is 30. Recommended 50,000 units every two weeks to once a month depending on the level. \par \par Problem 5: r/o OSAS\par -She is to have a in lab sleep study to rule out OSAS (as per her insurance) (re-stressed) \par - Due to his / her EDS, fatigue, snoring, elevated Mallampati class, poor memory, poor concentration, and neck size, he / she is being recommended for a home sleep study. \par - Sleep apnea is associated with adverse clinical consequences which an affect most organ systems. Cardiovascular disease risk includes arrhythmias, atrial fibrillation, hypertension, coronary artery disease, and stroke. Metabolic disorders include diabetes type 2, non-alcoholic fatty liver disease. Mood disorder especially depression; and cognitive decline especially in the elderly. Associations with chronic reflux/Tuttle’s esophagus some but not all inclusive. \par -Reasons include arousal consistent with hypopnea; respiratory events most prominent in REM sleep or supine position; therefore sleep staging and body position are important for accurate diagnosis and estimation of AHI. \par \par problem 6: poor breathing mechanics\par -Proper breathing techniques were reviewed with an emphasis of exhalation. Patient instructed to breath in for 1 second and out for four seconds. Patient was encouraged to not talk while walking.\par \par problem 7: out of shape\par -Exercise and diet control were discussed and are highly encouraged. Treatment options were given such as, aqua therapy, and contacting a nutritionist. Recommended to use the elliptical, stationary bike, less use of treadmill. Mindful eating was explained to the patient. Obesity is associated with worsening asthma, shortness of breath, and potential for cardiac disease, diabetes, and other underlying medical conditions. \par \par problem 8: health maintenance \par -educated patient on COVID 19 vaccine and appropriate recommendations (Pt. has COVID fears) -vax x2 5/2021 \par -recommended to continue to diet and exercise, try to have an anti-inflammatory diet \par -s/p influenza vaccination 2021 refused \par -recommended strep pneumonia vaccines: Prevnar-13 vaccine, followed by Pneumo vaccine 23 one year following\par -recommended early intervention for URIs\par -recommended regular osteoporosis evaluations\par -recommended early dermatological evaluations\par -recommended after the age of 50 to the age of 70, colonoscopy every 5 years \par  \par \par F/u in 2 months \par pt is encouraged to call or fax the office with any questions or concerns.

## 2022-01-20 ENCOUNTER — APPOINTMENT (OUTPATIENT)
Dept: SURGICAL ONCOLOGY | Facility: CLINIC | Age: 47
End: 2022-01-20
Payer: COMMERCIAL

## 2022-01-20 VITALS
RESPIRATION RATE: 16 BRPM | TEMPERATURE: 97.7 F | SYSTOLIC BLOOD PRESSURE: 145 MMHG | HEART RATE: 80 BPM | DIASTOLIC BLOOD PRESSURE: 95 MMHG | WEIGHT: 114 LBS | OXYGEN SATURATION: 97 % | HEIGHT: 61 IN | BODY MASS INDEX: 21.52 KG/M2

## 2022-01-20 DIAGNOSIS — N63.0 UNSPECIFIED LUMP IN UNSPECIFIED BREAST: ICD-10-CM

## 2022-01-20 PROCEDURE — 99204 OFFICE O/P NEW MOD 45 MIN: CPT

## 2022-02-02 ENCOUNTER — RX RENEWAL (OUTPATIENT)
Age: 47
End: 2022-02-02

## 2022-02-09 ENCOUNTER — OUTPATIENT (OUTPATIENT)
Dept: OUTPATIENT SERVICES | Facility: HOSPITAL | Age: 47
LOS: 1 days | End: 2022-02-09
Payer: COMMERCIAL

## 2022-02-09 ENCOUNTER — APPOINTMENT (OUTPATIENT)
Dept: MRI IMAGING | Facility: IMAGING CENTER | Age: 47
End: 2022-02-09
Payer: COMMERCIAL

## 2022-02-09 DIAGNOSIS — Z00.00 ENCOUNTER FOR GENERAL ADULT MEDICAL EXAMINATION WITHOUT ABNORMAL FINDINGS: ICD-10-CM

## 2022-02-09 PROCEDURE — A9585: CPT

## 2022-02-09 PROCEDURE — C8908: CPT

## 2022-02-09 PROCEDURE — 77049 MRI BREAST C-+ W/CAD BI: CPT | Mod: 26

## 2022-02-09 PROCEDURE — C8937: CPT

## 2022-02-10 NOTE — REASON FOR VISIT
[Initial Consultation] : an initial consultation for [Other: _____] : [unfilled] [FreeTextEntry2] : Baseline breast examination, recent benign left breast needle biopsy, increased breast cancer risk score.\par And in October 2021, transient left nipple discharge and associated left breast tenderness

## 2022-02-10 NOTE — HISTORY OF PRESENT ILLNESS
[de-identified] : \par \par 46-year-old lady referred by OB/GYN (Melissa TOLEDO NP) for breast examination.\par \par \par + Prior personal history:\par 2021:\par Left breast (2:00) sonogram guided core needle biopsy of a 6 mm hypoechoic nodule at [facility].\par These benign results are concordant.\par \par Radiology recommended 6-month follow-up bilateral breast ultrasounds in 2022....................... \par \par \par The above nodule was an asymptomatic nonpalpable finding identified on bilateral diagnostic mammography and sonography 2021 at [facility].\par CC (preimaging):\par 1.  Left breast yellowish discharge in the last week of 2021, this has since resolved.\par 2.  + Associated left breast tenderness x6 weeks.\par The discomfort is cyclical in nature, and poorly localized.\par No other complaints related to either breast.\par \par \par No other personal history of breast disease.\par No other breast biopsies.\par \par \par + FH:\par Maternal grandmother: Breast cancer at 68.\par Paternal aunt: Breast cancer at 55.\par \par + Maternal aunt: Ovarian cancer.\par + Paternal aunt: Ovarian cancer.\par \par Not Ashkenazi.\par \par \par Other family history of malignancy:\par Paternal aunt had melanoma.\par Maternal great aunt had pancreatic cancer.\par \par \par Tyrer-Cuzick risk score = 16.8%.\par Breast cancer risk score 27.4% (calculated 2022)\par \par \par Menarche at 13.\par  4, para 2, first at 22.\par Her cycles are regular.\par No regular use of exogenous hormones.\par \par She occasionally requires steroids for nasal polyps, and asthma.\par \par \par PMD: Dr. Paolo COOPER\par \par No pacemaker or defibrillator.\par No anticoagulants.\par \par + Asthma, and rhinitis.\par Pulmonary: Dr. Jose Antonio NAVARRETE.\par Treated with Symbicort, Xopenex, Breztri inhaler, Pulmicort, and Xyflo.\par She also uses Flonase, Astelin, and Xhance.\par Previous nasal polypectomy (2021 Dr LARA at [facility])\par \par + Reflux (GERD).\par She takes daily Pepcid\par \par + History of nephrolithiasis.\par Previous ESWL.\par \par + Right leg ORIF.\par + Hardware.\par \par \par GYN: Melissa TOLEDO NP.\par 2021 visit was unremarkable.\par \par \par She has not yet had her baseline colonoscopy\par

## 2022-02-10 NOTE — REVIEW OF SYSTEMS
[Negative] : Endocrine [FreeTextEntry4] : Rhinitis [FreeTextEntry6] : Asthma [FreeTextEntry9] : Nephrolithiasis [de-identified] : RLE ORIF [FreeTextEntry1] : Increased risk of breast cancer

## 2022-02-10 NOTE — PHYSICAL EXAM
[Normal] : supple, no neck mass and thyroid not enlarged [Normal Neck Lymph Nodes] : normal neck lymph nodes  [Normal Supraclavicular Lymph Nodes] : normal supraclavicular lymph nodes [Normal Axillary Lymph Nodes] : normal axillary lymph nodes [Normal] : normal appearance, no rash, nodules, vesicles, ulcers, erythema [de-identified] : Groins not examined [de-identified] : Below

## 2022-02-10 NOTE — ASSESSMENT
[FreeTextEntry1] : Today she is asymptomatic with a normal breast examination.\par \par \par + History of left breast "discharge", this has resolved.\par + Associated left breast tenderness, this is cyclical, poorly localized; asymptomatic today.\par \par \par November 2021 bilateral mammogram and sonogram at [facility]: BI-RADS 4.\par She will bring a copy of this imaging so I can submit it for entry into our system\par \par November 2021: Left breast (2:00) sonogram guided core biopsy at [facility]: Benign and concordant.\par Bilateral follow-up breast ultrasounds pending for June 2022...................... \par \par \par Increased risk of breast cancer = 27.4%.\par \par I suggested a baseline breast MRI.\par She understands and agrees\par Prescription entered today.\par \par If unremarkable, she will have her follow-up breast ultrasounds in June 2022, prescription provided.\par \par If she is asymptomatic, with normal imaging, we should see her annually, sooner if needed.\par \par Referring provider contacted through secure email\par \par \par 2/10/2022.\par We spoke.\par She had her breast MRI at [facility]: BI-RADS 4.\par Left breast (retroareolar) abnormality.\par "Second Look sonogram" with core biopsy recommended.\par She understands and agrees.\par Prescription entered

## 2022-02-11 ENCOUNTER — RESULT REVIEW (OUTPATIENT)
Age: 47
End: 2022-02-11

## 2022-02-24 ENCOUNTER — APPOINTMENT (OUTPATIENT)
Dept: ULTRASOUND IMAGING | Facility: IMAGING CENTER | Age: 47
End: 2022-02-24
Payer: COMMERCIAL

## 2022-02-24 ENCOUNTER — RESULT REVIEW (OUTPATIENT)
Age: 47
End: 2022-02-24

## 2022-02-24 ENCOUNTER — OUTPATIENT (OUTPATIENT)
Dept: OUTPATIENT SERVICES | Facility: HOSPITAL | Age: 47
LOS: 1 days | End: 2022-02-24
Payer: COMMERCIAL

## 2022-02-24 DIAGNOSIS — Z00.8 ENCOUNTER FOR OTHER GENERAL EXAMINATION: ICD-10-CM

## 2022-02-24 PROCEDURE — 76642 ULTRASOUND BREAST LIMITED: CPT

## 2022-02-24 PROCEDURE — 76642 ULTRASOUND BREAST LIMITED: CPT | Mod: 26,LT

## 2022-03-02 ENCOUNTER — APPOINTMENT (OUTPATIENT)
Dept: OBGYN | Facility: CLINIC | Age: 47
End: 2022-03-02
Payer: COMMERCIAL

## 2022-03-02 VITALS
BODY MASS INDEX: 21.71 KG/M2 | SYSTOLIC BLOOD PRESSURE: 136 MMHG | HEIGHT: 61 IN | WEIGHT: 115 LBS | DIASTOLIC BLOOD PRESSURE: 82 MMHG

## 2022-03-02 DIAGNOSIS — N93.9 ABNORMAL UTERINE AND VAGINAL BLEEDING, UNSPECIFIED: ICD-10-CM

## 2022-03-02 DIAGNOSIS — Z01.419 ENCOUNTER FOR GYNECOLOGICAL EXAMINATION (GENERAL) (ROUTINE) W/OUT ABNORMAL FINDINGS: ICD-10-CM

## 2022-03-02 PROCEDURE — 99213 OFFICE O/P EST LOW 20 MIN: CPT | Mod: 25

## 2022-03-02 PROCEDURE — 99396 PREV VISIT EST AGE 40-64: CPT

## 2022-03-02 NOTE — END OF VISIT
[FreeTextEntry3] : I, Sukhdeep Neumannlawrence acted as a scribe on behalf of Dr. Medeiros on 03/02/2022 \par \par All medical entries made by the scribe were at my, Dr. Medeiros, direction and personally dictated by me on 03/02/2022. I have reviewed the chart and agree that the record accurately reflects my personal performance of the history, physical exam, assessment and plan. I have also personally directed, reviewed, and agreed with the chart.\par

## 2022-03-02 NOTE — PHYSICAL EXAM
[Appropriately responsive] : appropriately responsive [Alert] : alert [No Acute Distress] : no acute distress [Soft] : soft [Non-tender] : non-tender [Non-distended] : non-distended [No HSM] : No HSM [No Lesions] : no lesions [No Mass] : no mass [Oriented x3] : oriented x3 [Examination Of The Breasts] : a normal appearance [No Masses] : no breast masses were palpable [Labia Majora] : normal [Labia Minora] : normal [Normal] : normal [Uterine Adnexae] : normal [Declined] : Patient declined rectal exam

## 2022-03-02 NOTE — HISTORY OF PRESENT ILLNESS
[Abnormal Quantity] : abnormal quantity [Heavy Bleeding] : heavy bleeding [Pain] : pain [TextBox_4] : Annual, heavy periods [LMPDate] : 2/21/22 [FreeTextEntry1] : 2/21/22

## 2022-03-02 NOTE — PLAN
[FreeTextEntry1] : KANU FERRO 47 year old F pt presents for annual visit, c/o AUB. \par \par 1. HCM\par -PAP/HPV\par -Discussed importance of monthly BSE\par -Mammogram/sonogram per Breast surgeon\par -due for colonoscopy\par 2. AUB\par -pelvic U/S\par -has a hx of MIrena that fell out within 1 month\par -discussed treatment, hormonal, UAE, surgical, will f/u after US pelvis\par \par Aleksandra Medeiros MD

## 2022-03-03 LAB — HPV HIGH+LOW RISK DNA PNL CVX: NOT DETECTED

## 2022-03-08 LAB — CYTOLOGY CVX/VAG DOC THIN PREP: NORMAL

## 2022-03-28 NOTE — ED PROVIDER NOTE - ATTESTATION, MLM
I have reviewed and confirmed nurses' notes for patient's medications, allergies, medical history, and surgical history. Home

## 2022-04-07 ENCOUNTER — NON-APPOINTMENT (OUTPATIENT)
Age: 47
End: 2022-04-07

## 2022-04-07 ENCOUNTER — APPOINTMENT (OUTPATIENT)
Dept: PULMONOLOGY | Facility: CLINIC | Age: 47
End: 2022-04-07
Payer: COMMERCIAL

## 2022-04-07 VITALS
DIASTOLIC BLOOD PRESSURE: 70 MMHG | HEART RATE: 80 BPM | RESPIRATION RATE: 16 BRPM | HEIGHT: 61 IN | SYSTOLIC BLOOD PRESSURE: 110 MMHG | BODY MASS INDEX: 22.28 KG/M2 | TEMPERATURE: 97.3 F | OXYGEN SATURATION: 98 % | WEIGHT: 118 LBS

## 2022-04-07 DIAGNOSIS — U07.1 COVID-19: ICD-10-CM

## 2022-04-07 DIAGNOSIS — Z71.89 OTHER SPECIFIED COUNSELING: ICD-10-CM

## 2022-04-07 PROCEDURE — 99214 OFFICE O/P EST MOD 30 MIN: CPT | Mod: 25

## 2022-04-07 PROCEDURE — 94010 BREATHING CAPACITY TEST: CPT

## 2022-04-29 NOTE — PROCEDURE
[FreeTextEntry1] : PFT - spi reveals mild restrictive and moderate obstructive dysfunction; FEV1 is  1.48 which is 58% of predicted, normal flow volume loop \par \par FENO was decline because insurance does not cover test  ; a normal value being less than 25\par Fractional exhaled nitric oxide (FENO) is regarded as a simple, noninvasive method for assessing eosinophilic airway inflammation. Produced by a variety of cells within the lung, nitric oxide (NO) concentrations are generally low in healthy individuals. However, high concentrations of NO appear to be involved in nonspecific host defense mechanisms and chronic inflammatory diseases such as asthma. The American Thor[employer] Society (ATS) therefore has recommended using FENO to aid in the diagnosis and monitoring of eosinophilic airway inflammation and asthma, and for identifying steroid responsive individuals whose chronic respiratory symptoms may be caused by airway inflammation.

## 2022-04-29 NOTE — HISTORY OF PRESENT ILLNESS
[FreeTextEntry1] : Ms. Mortensen is a 47 year old female presenting to the office for a follow up visit for allergic rhinitis, asthma, cough, GERD, low vitamin D, post-nasal drip, sinus issues, snoring and shortness of breath. Her chief complaint is\par \par -she notes that she has been taking steroids due to mucous\par -she notes she had fever and sweats\par -she notes she started to wheeze\par -she noted chest tightness\par -she noted headache and sinus congestion\par -she notes her bowels are regular\par -she notes that her sense of smell and taste are normal\par -she notes that her memory and concentration are terrible\par -she notes that she is forgetful\par -she notes that she does not get enough sleep because her  snores\par -she notes heartburn when she eats spicy foods\par -she notes that Breztri has been working better for her than Symbicort\par -s/p COVID19 infection December 2021\par -she notes that her nebulizer did not help her so she started taking prednisone 20 once per morning and night\par -s/p COVID vaccination x2\par \par patient denies any nausea, vomiting, sweats, chest pain, chest pressure, palpitations, coughing, fatigue, diarrhea, constipation, dysphagia, myalgias, dizziness, leg swelling, leg pain, itchy eyes, itchy ears, reflux or sour taste in the mouth

## 2022-04-29 NOTE — ASSESSMENT
[FreeTextEntry1] : Ms. Mortensen is a 47 year old female currently symptomatic of eosinophilic/ allergic asthma, nasal polyps, allergy, and self-induced GERD. She is generally non-compliant with her treatment protocol, s/p influenza 1/2020 - active asthma / allergy / GERD- symptomatic. Self Rx Prednisone.- now sp nasal polypectomy (quidis( 11/2021- s/p  covid-19 infection 12/2021 - now mild asthma flair) \par \par Her shortness of breath is multifactorial due to: \par -out of shape\par -poor breathing mechanics\par -asthma\par \par problem 1: severe persistent asthma (semi active)\par -discontinue Albuterol nebulizer at least BID up to QiD (gargle and spit after use) \par -continue Breztri 2 inhalations BID \par -continue Xopenex (0.63) TID by nebulizer, PRN  (gargle and rinse after use)\par -continue Pulmicort (Budesonide) (0.5) BID by nebulizer, PRN  (gargle and rinse after use)\par - continue Xyflo 1200 BID\par -self prednisone taper 3/2022\par -Asthma is  believed to be caused by inherited (genetic) and environmental factor, but its exact cause is unknown. Asthma may be triggered by allergens, lung infections, or irritants in the air. Asthma triggers are different for each person\par \par Problem 1A: Covid-19 infection 12/2021 (resolved)\par Problem: Health Maintenance/COVID19 Precautions \par -Recommend quarantine for 10 days \par -alkaseltzer\par  -s/p Prednisone 20 mg x 7 days, 10 mg x 7 days \par Information sheet given to the patient to be reviewed, this medication is never to be used without consulting the prescribing physician. Proper dietary restraint is necessary specifically salt containing foods, if any reaction may occur should be reported. \par -Stay home as much as possible.\par COVID-19 precautionary Immune Support Recommendations:\par -OTC Vitamin C 500mg BID \par -OTC Quercetin 250-500mg BID \par -OTC Zinc 75-100mg per day \par -OTC Melatonin 1 or 2mg a night \par -OTC Vitamin D 1-4000mg per day \par -OTC Tonic Water 8oz per day\par -Water 0.5-1 gallon per day\par Asthma and COVID19:\par You need to make sure your asthma is under control. This often requires the use of inhaled corticosteroids (and sometimes oral corticosteroids). Inhaled corticosteroids do not likely reduce your immune system’s ability to fight infections, but oral corticosteroids may. It is important to use the steps above to protect yourself to limit your exposure to any respiratory virus. \par \par Problem 1B: eosinophilic Asthma (re d/w patient 3/17/2021) ; 12/2021 \par -EOS level 1713.3 on 5/22/2021\par - Candidate for Dupixent/Fasenra\par - Dupixent is a prescription medicine used with other asthma medicines for the maintenance treatment of moderate-to-severe asthma in people aged 12 years and older whose asthma is not controlled with their current asthma medicines. Dupixent helps prevent severe asthma attacks (exacerbations) and can improve your breathing. Dupixent may also help reduce the amount of oral corticosteroids you need while preventing severe asthma attacks and improving your breathing. Dupixent is not used to treat sudden breathing problems. Risks and side effect of Dupixent were discussed and reviewed with patient. \par \par problem 2: allergies/sinus\par -get Blood work to include: asthma panel, food IgE panel, IgE level, eosinophil level, vitamin D level \par - Recommended the Navage  \par -continue Xhance BID \par -continue Astelin (.15) 1 sniff each nostril BID  \par -continue Zyrtec 10 mg QHS \par - Recheck eosinophil level, IgE level, Vitamin D level \par -Environmental measures for allergies were encouraged including mattress and pillow cover, air purifier, and environmental controls. \par \par Problem 2A: Nasal Polyps\par -s/p polypectomy \par - Complete CT of Sinuses as per ENT\par - Candidate for Dupixent (hesitant) \par Dupixent is a prescription medicine used with other asthma medicines for the maintenance treatment of moderate-to-severe asthma in people aged 12 years and older whose asthma is not controlled with their current asthma medicines. Dupixent helps prevent severe asthma attacks (exacerbations) and can improve your breathing. Dupixent may also help reduce the amount of oral corticosteroids you need while preventing severe asthma attacks and improving your breathing. Dupixent is not used to treat sudden breathing problems. Risks and side effect of Dupixent were discussed and reviewed with patient. \par \par problem 3: GERD\par -continue Pepcid 40 mg QHS \par -Things to avoid including overeating, spicy foods, tight clothing, eating within three hours of bed, this list is not all inclusive. \par -For treatment of reflux, possible options discussed including diet control, H2 blockers, PPIs, as well as coating motility agents discussed as treatment options. Timing of meals and proximity of last meal to sleep were discussed. If symptoms persist, a formal gastrointestinal evaluation is needed.\par -Rule of 2's: Avoid eating too late, too fast, too much, too spicy or within two hours of bedtime \par \par problem 4: low vitamin D\par -recommended to take Vitamin D supplements 50,000 units twice monthly \par -Low vitamin D Has been associated with asthma exacerbations and increased allergic symptoms. The goal based on recent information is maintaining levels between 50-70 and low normal is 30. Recommended 50,000 units every two weeks to once a month depending on the level. \par \par Problem 5: r/o OSAS\par -She is to have a in lab sleep study to rule out OSAS (as per her insurance) (re-stressed) \par - Due to his / her EDS, fatigue, snoring, elevated Mallampati class, poor memory, poor concentration, and neck size, he / she is being recommended for a home sleep study. \par - Sleep apnea is associated with adverse clinical consequences which an affect most organ systems. Cardiovascular disease risk includes arrhythmias, atrial fibrillation, hypertension, coronary artery disease, and stroke. Metabolic disorders include diabetes type 2, non-alcoholic fatty liver disease. Mood disorder especially depression; and cognitive decline especially in the elderly. Associations with chronic reflux/Tuttle’s esophagus some but not all inclusive. \par -Reasons include arousal consistent with hypopnea; respiratory events most prominent in REM sleep or supine position; therefore sleep staging and body position are important for accurate diagnosis and estimation of AHI. \par \par problem 6: poor breathing mechanics\par -Recommended Wim Hof and Buteyko breathing techniques \par -Proper breathing techniques were reviewed with an emphasis of exhalation. Patient instructed to breath in for 1 second and out for four seconds. Patient was encouraged to not talk while walking.\par \par problem 7: out of shape\par -Exercise and diet control were discussed and are highly encouraged. Treatment options were given such as, aqua therapy, and contacting a nutritionist. Recommended to use the elliptical, stationary bike, less use of treadmill. Mindful eating was explained to the patient. Obesity is associated with worsening asthma, shortness of breath, and potential for cardiac disease, diabetes, and other underlying medical conditions. \par \par problem 8: health maintenance \par -educated patient on COVID 19 vaccine and appropriate recommendations (Pt. has COVID fears) -vax x2 5/2021 \par -recommended to continue to diet and exercise, try to have an anti-inflammatory diet \par -s/p influenza vaccination 2021 refused \par -recommended strep pneumonia vaccines: Prevnar-13 vaccine, followed by Pneumo vaccine 23 one year following\par -recommended early intervention for URIs\par -recommended regular osteoporosis evaluations\par -recommended early dermatological evaluations\par -recommended after the age of 50 to the age of 70, colonoscopy every 5 years \par  \par \par F/u in 2 months \par pt is encouraged to call or fax the office with any questions or concerns.

## 2022-04-29 NOTE — ADDENDUM
[FreeTextEntry1] : Documented by Chandu Webster acting as a scribe for Dr. Jose Antonio Coe on 04/07/2022 .\par \par All medical record entries made by the Scribe were at my, Dr. Jose Antonio Coe's, direction and personally dictated by me on. I have reviewed the chart and agree that the record accurately reflects my personal performance of the history, physical exam, assessment and plan. I have also personally directed, reviewed, and agree with the discharge instructions.

## 2022-04-29 NOTE — PHYSICAL EXAM
[No Acute Distress] : no acute distress [Well Nourished] : well nourished [No Deformities] : no deformities [Well Developed] : well developed [II] : Mallampati Class: II [TextBox_68] : I:E ratio 1:3; clear

## 2022-06-29 RX ORDER — DUPILUMAB 300 MG/2ML
300 INJECTION, SOLUTION SUBCUTANEOUS
Qty: 1 | Refills: 0 | Status: ACTIVE | COMMUNITY
Start: 2022-06-28 | End: 1900-01-01

## 2022-06-29 RX ORDER — DUPILUMAB 300 MG/2ML
300 INJECTION, SOLUTION SUBCUTANEOUS
Qty: 1 | Refills: 11 | Status: ACTIVE | COMMUNITY
Start: 2022-06-28 | End: 1900-01-01

## 2022-07-19 ENCOUNTER — RX RENEWAL (OUTPATIENT)
Age: 47
End: 2022-07-19

## 2022-08-09 ENCOUNTER — APPOINTMENT (OUTPATIENT)
Dept: PULMONOLOGY | Facility: CLINIC | Age: 47
End: 2022-08-09

## 2022-12-16 ENCOUNTER — RESULT REVIEW (OUTPATIENT)
Age: 47
End: 2022-12-16

## 2022-12-21 ENCOUNTER — OUTPATIENT (OUTPATIENT)
Dept: OUTPATIENT SERVICES | Facility: HOSPITAL | Age: 47
LOS: 1 days | End: 2022-12-21
Payer: COMMERCIAL

## 2022-12-21 DIAGNOSIS — N63.0 UNSPECIFIED LUMP IN UNSPECIFIED BREAST: ICD-10-CM

## 2022-12-21 PROCEDURE — 88321 CONSLTJ&REPRT SLD PREP ELSWR: CPT

## 2022-12-22 LAB — SURGICAL PATHOLOGY STUDY: SIGNIFICANT CHANGE UP

## 2023-03-21 ENCOUNTER — APPOINTMENT (OUTPATIENT)
Dept: PULMONOLOGY | Facility: CLINIC | Age: 48
End: 2023-03-21
Payer: COMMERCIAL

## 2023-03-21 ENCOUNTER — NON-APPOINTMENT (OUTPATIENT)
Age: 48
End: 2023-03-21

## 2023-03-21 VITALS
OXYGEN SATURATION: 98 % | DIASTOLIC BLOOD PRESSURE: 70 MMHG | TEMPERATURE: 97.1 F | HEIGHT: 61 IN | HEART RATE: 71 BPM | RESPIRATION RATE: 16 BRPM | SYSTOLIC BLOOD PRESSURE: 116 MMHG | BODY MASS INDEX: 22.66 KG/M2 | WEIGHT: 120 LBS

## 2023-03-21 PROCEDURE — 94010 BREATHING CAPACITY TEST: CPT

## 2023-03-21 PROCEDURE — 94727 GAS DIL/WSHOT DETER LNG VOL: CPT

## 2023-03-21 PROCEDURE — 94729 DIFFUSING CAPACITY: CPT

## 2023-03-21 PROCEDURE — 99214 OFFICE O/P EST MOD 30 MIN: CPT | Mod: 25

## 2023-03-21 RX ORDER — PREDNISONE 10 MG/1
10 TABLET ORAL
Qty: 60 | Refills: 0 | Status: ACTIVE | COMMUNITY
Start: 2023-03-21 | End: 1900-01-01

## 2023-03-21 RX ORDER — BUDESONIDE 0.5 MG/2ML
0.5 INHALANT ORAL
Qty: 2 | Refills: 3 | Status: ACTIVE | COMMUNITY
Start: 2023-03-21 | End: 1900-01-01

## 2023-03-21 RX ORDER — THEOPHYLLINE 400 MG/1
400 TABLET, EXTENDED RELEASE ORAL DAILY
Qty: 30 | Refills: 0 | Status: ACTIVE | COMMUNITY
Start: 2023-03-21 | End: 1900-01-01

## 2023-03-21 RX ORDER — IPRATROPIUM BROMIDE AND ALBUTEROL SULFATE 2.5; .5 MG/3ML; MG/3ML
0.5-2.5 (3) SOLUTION RESPIRATORY (INHALATION) 4 TIMES DAILY
Qty: 120 | Refills: 1 | Status: ACTIVE | COMMUNITY
Start: 2023-03-21 | End: 1900-01-01

## 2023-03-21 NOTE — REVIEW OF SYSTEMS
[Negative] : Endocrine [Postnasal Drip] : postnasal drip [Cough] : cough [Chest Tightness] : chest tightness [Dyspnea] : dyspnea [Wheezing] : wheezing [SOB on Exertion] : sob on exertion [Sore Throat] : no sore throat [Nasal Congestion] : no nasal congestion [Sinus Problems] : no sinus problems

## 2023-03-21 NOTE — PROCEDURE
[FreeTextEntry1] : PFTs\par SPI: Severe obstructive lung defect FEV1/FVC was 49; FEV1 was 50% of predicted\par \par Normal lung volumes\par \par Mild decrease in diffusing capacity

## 2023-03-21 NOTE — ASSESSMENT
[FreeTextEntry1] : Ms. Mortensen is a 48 year old female presenting to the office for an acute visit. PMHx includes allergic rhinitis, asthma, cough, GERD, low vitamin D, post-nasal drip, sinus issues, snoring and shortness of breath. She is currently in the midst of an acute asthma exacerbation. \par \par Her shortness of breath is multifactorial due to: \par -out of shape\par -poor breathing mechanics\par -asthma\par \par problem 1: severe persistent asthma now with acute exacerbation \par -add nebulizer from office\par -add duoneb via neb BID up to QiD (gargle and spit after use)\par -add Budesonide .5mg via neb BID rinse and gargle after use\par -continue Breztri 2 inhalations BID rinse and gargle\par -add theophylline 400 mg PO QD\par -add Prednisone 30 mg x 5, 20 mg x 5, and 10 mg x 5 days\par -reconsider biologic therapy if she has multiple exacerbations\par \par Problem 1B: eosinophilic Asthma (re d/w patient 3/17/2021) ; 12/2021 \par - Candidate for Dupixent/Fasenra\par - Dupixent is a prescription medicine used with other asthma medicines for the maintenance treatment of moderate-to-severe asthma in people aged 12 years and older whose asthma is not controlled with their current asthma medicines. Dupixent helps prevent severe asthma attacks (exacerbations) and can improve your breathing. Dupixent may also help reduce the amount of oral corticosteroids you need while preventing severe asthma attacks and improving your breathing. Dupixent is not used to treat sudden breathing problems. Risks and side effect of Dupixent were discussed and reviewed with patient. \par \par problem 2: allergies/sinus with new post nasal drip\par - Recommended the Navage  \par -continue Xhance BID \par - add Azelastine 1 sniff each nostril BID  \par -continue Zyrtec 10 mg QHS \par \par Problem 3: Nasal Polyps\par -s/p polypectomy \par - Complete CT of Sinuses as per ENT\par - Candidate for Dupixent (hesitant) \par Dupixent is a prescription medicine used with other asthma medicines for the maintenance treatment of moderate-to-severe asthma in people aged 12 years and older whose asthma is not controlled with their current asthma medicines. Dupixent helps prevent severe asthma attacks (exacerbations) and can improve your breathing. Dupixent may also help reduce the amount of oral corticosteroids you need while preventing severe asthma attacks and improving your breathing. Dupixent is not used to treat sudden breathing problems. Risks and side effect of Dupixent were discussed and reviewed with patient. \par \par problem 4: GERD-stable\par -now off Pepcid 40 mg QHS \par -Things to avoid including overeating, spicy foods, tight clothing, eating within three hours of bed, this list is not all inclusive. \par  \par \par F/u in 4-6 months \par pt is encouraged to call or fax the office with any questions or concerns.

## 2023-03-21 NOTE — REASON FOR VISIT
[Acute] : an acute visit [FreeTextEntry1] : allergic rhinitis, asthma, cough, GERD, low vitamin D, post-nasal drip, sinus issues, snoring and shortness of breath

## 2023-03-21 NOTE — PHYSICAL EXAM
[No Acute Distress] : no acute distress [Well Nourished] : well nourished [No Deformities] : no deformities [Well Developed] : well developed [II] : Mallampati Class: II [Normal Oropharynx] : normal oropharynx [Normal Appearance] : normal appearance [Supple] : supple [No Neck Mass] : no neck mass [No JVD] : no jvd [Normal Rate/Rhythm] : normal rate/rhythm [Normal S1, S2] : normal s1, s2 [No Murmurs] : no murmurs [No Resp Distress] : no resp distress [No Abnormalities] : no abnormalities [Benign] : benign [Normal Gait] : normal gait [No Clubbing] : no clubbing [No Cyanosis] : no cyanosis [No Edema] : no edema [FROM] : FROM [Normal Color/ Pigmentation] : normal color/ pigmentation [No Focal Deficits] : no focal deficits [Oriented x3] : oriented x3 [Normal Mood] : normal mood [Normal Affect] : normal affect [Remote Memory Normal] : remote memory normal [TextBox_68] : BL wheezing throughout

## 2023-03-21 NOTE — HISTORY OF PRESENT ILLNESS
[FreeTextEntry1] : VISIT 4/7/2022 \par Ms. Mortensen is a 47 year old female presenting to the office for a follow up visit for allergic rhinitis, asthma, cough, GERD, low vitamin D, post-nasal drip, sinus issues, snoring and shortness of breath. Her chief complaint is\par \par -she notes that she has been taking steroids due to mucous\par -she notes she had fever and sweats\par -she notes she started to wheeze\par -she noted chest tightness\par -she noted headache and sinus congestion\par -she notes her bowels are regular\par -she notes that her sense of smell and taste are normal\par -she notes that her memory and concentration are terrible\par -she notes that she is forgetful\par -she notes that she does not get enough sleep because her  snores\par -she notes heartburn when she eats spicy foods\par -she notes that Breztri has been working better for her than Symbicort\par -s/p COVID19 infection December 2021\par -she notes that her nebulizer did not help her so she started taking prednisone 20 once per morning and night\par -s/p COVID vaccination x2\par \par patient denies any nausea, vomiting, sweats, chest pain, chest pressure, palpitations, coughing, fatigue, diarrhea, constipation, dysphagia, myalgias, dizziness, leg swelling, leg pain, itchy eyes, itchy ears, reflux or sour taste in the mouth \par \par VISIT TODAY 3/21/2023:\par Ms. Mortensen is a 48 year old female presenting to the office for an acute visit. PMHx includes allergic rhinitis, asthma, cough, GERD, low vitamin D, post-nasal drip, sinus issues, snoring and shortness of breath. \par \par Pt reports she had been doing very all year. She felt like her asthma was well controlled on Breztri without any issues. She feels the nasal polypectomy was extremely beneficial. \par In the last 1.5 weeks- she felt her asthma worsening. 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 \par 2 weeks ago she did have some sinus issues with mucus but did not feel sick or have a fever.\par She continues to deal with post nasal drip. \par She denies any GERD issues. \par She is compliant on her xhance.\par She is not using xyflo. \par She does not have a nebulizer in the home anymore- her last one was 15 years old.\par She is currently on iron infusions.\par She has transformed her diet and avoids certain triggers- no longer has eczema. \par She is still not interested in getting on a biologic drug, especially since she has been stable for a while now.\par \par

## 2023-03-28 ENCOUNTER — RX RENEWAL (OUTPATIENT)
Age: 48
End: 2023-03-28

## 2023-08-08 NOTE — HISTORY OF PRESENT ILLNESS
[FreeTextEntry1] : Ms. Mortensen is a 44 year old female presenting to the office for an acute visit for allergic rhinitis, asthma, cough, GERD, low vitamin D, post-nasal drip, sinus issues, snoring and shortness of breath. Her chief complaint is cough and SOB. \par \par She reports that she has been sick since early September.  She has been dealing with cough, congestion, chills, sinus pressure and congestion. She has gotten progressively worse. She reports cough with yellow to dk green/brown phlegm, chest congestion, shortness of breath with any exertion. She is breathy with conversation. She states she is dealing with severe nasal congestion and post nasal drip. She has a hx of nasal polyps that keep regrowing. She reports wheezing, heaviness and tightness in her chest and poor sleep due to cough.  She is currently using her rescue inhaler and Symbicort and added on neb tx's BID.  She admits to increased reflux and she is on OTC PPI but she continues to feel reflux and has persistent throat clearing. She reports her body hurts from coughing so much and so hard. \par \par She reports she has been getting iron infusions with her hematologist- Dr. Yovany Us.\par She states she was told that she has a high eosinophil count. \par She is considering going on biologic therapy due to her severe asthma exacerbations.\par No blood work previously ordered was completed.  Pt found lying in bed without c/o, pleasant and engaging and agreeable to PT

## 2023-09-19 ENCOUNTER — APPOINTMENT (OUTPATIENT)
Dept: PULMONOLOGY | Facility: CLINIC | Age: 48
End: 2023-09-19

## 2023-10-02 ENCOUNTER — RX RENEWAL (OUTPATIENT)
Age: 48
End: 2023-10-02

## 2023-10-03 ENCOUNTER — RX RENEWAL (OUTPATIENT)
Age: 48
End: 2023-10-03

## 2023-10-19 ENCOUNTER — APPOINTMENT (OUTPATIENT)
Dept: PULMONOLOGY | Facility: CLINIC | Age: 48
End: 2023-10-19
Payer: COMMERCIAL

## 2023-10-19 VITALS
SYSTOLIC BLOOD PRESSURE: 122 MMHG | BODY MASS INDEX: 22.66 KG/M2 | TEMPERATURE: 97.2 F | HEART RATE: 69 BPM | RESPIRATION RATE: 16 BRPM | WEIGHT: 120 LBS | DIASTOLIC BLOOD PRESSURE: 62 MMHG | OXYGEN SATURATION: 99 % | HEIGHT: 61 IN

## 2023-10-19 DIAGNOSIS — J33.9 NASAL POLYP, UNSPECIFIED: ICD-10-CM

## 2023-10-19 PROCEDURE — 99214 OFFICE O/P EST MOD 30 MIN: CPT

## 2023-10-19 RX ORDER — PREDNISONE 10 MG/1
10 TABLET ORAL
Qty: 60 | Refills: 0 | Status: DISCONTINUED | COMMUNITY
Start: 2020-03-11 | End: 2023-10-19

## 2023-10-19 RX ORDER — AZELASTINE HYDROCHLORIDE AND FLUTICASONE PROPIONATE 137; 50 UG/1; UG/1
137-50 SPRAY, METERED NASAL
Qty: 3 | Refills: 1 | Status: DISCONTINUED | COMMUNITY
Start: 2018-03-27 | End: 2023-10-19

## 2023-10-19 RX ORDER — AZELASTINE HYDROCHLORIDE 137 UG/1
0.1 SPRAY, METERED NASAL TWICE DAILY
Qty: 1 | Refills: 5 | Status: ACTIVE | COMMUNITY
Start: 2023-03-21 | End: 1900-01-01

## 2023-10-19 RX ORDER — AZITHROMYCIN 500 MG/1
500 TABLET, FILM COATED ORAL DAILY
Qty: 5 | Refills: 0 | Status: ACTIVE | COMMUNITY
Start: 2023-10-19 | End: 1900-01-01

## 2023-10-19 RX ORDER — ACLIDINIUM BROMIDE 400 UG/1
400 POWDER, METERED RESPIRATORY (INHALATION) TWICE DAILY
Qty: 3 | Refills: 1 | Status: DISCONTINUED | COMMUNITY
Start: 2020-03-17 | End: 2023-10-19

## 2023-10-19 RX ORDER — BECLOMETHASONE DIPROPIONATE HFA 80 UG/1
80 AEROSOL, METERED RESPIRATORY (INHALATION) TWICE DAILY
Qty: 3 | Refills: 1 | Status: DISCONTINUED | COMMUNITY
Start: 2018-03-27 | End: 2023-10-19

## 2023-10-19 RX ORDER — PREDNISONE 10 MG/1
10 TABLET ORAL
Qty: 75 | Refills: 75 | Status: DISCONTINUED | COMMUNITY
Start: 2018-02-26 | End: 2023-10-19

## 2023-10-19 RX ORDER — IPRATROPIUM BROMIDE AND ALBUTEROL SULFATE 2.5; .5 MG/3ML; MG/3ML
0.5-2.5 (3) SOLUTION RESPIRATORY (INHALATION) 4 TIMES DAILY
Qty: 120 | Refills: 3 | Status: ACTIVE | COMMUNITY
Start: 2019-09-26 | End: 1900-01-01

## 2023-10-19 RX ORDER — PREDNISONE 10 MG/1
10 TABLET ORAL
Qty: 60 | Refills: 0 | Status: DISCONTINUED | COMMUNITY
Start: 2019-02-14 | End: 2023-10-19

## 2023-10-19 RX ORDER — DOXYCYCLINE 100 MG/1
100 TABLET, FILM COATED ORAL
Qty: 20 | Refills: 0 | Status: DISCONTINUED | COMMUNITY
Start: 2019-02-14 | End: 2023-10-19

## 2023-11-06 PROBLEM — J33.9 MULTIPLE NASAL POLYPS: Status: ACTIVE | Noted: 2019-09-26

## 2024-01-22 ENCOUNTER — APPOINTMENT (OUTPATIENT)
Dept: PULMONOLOGY | Facility: CLINIC | Age: 49
End: 2024-01-22
Payer: COMMERCIAL

## 2024-01-22 VITALS
BODY MASS INDEX: 23.6 KG/M2 | OXYGEN SATURATION: 98 % | RESPIRATION RATE: 17 BRPM | WEIGHT: 125 LBS | HEIGHT: 61 IN | TEMPERATURE: 97.4 F | HEART RATE: 91 BPM | DIASTOLIC BLOOD PRESSURE: 68 MMHG | SYSTOLIC BLOOD PRESSURE: 132 MMHG

## 2024-01-22 PROCEDURE — 99214 OFFICE O/P EST MOD 30 MIN: CPT

## 2024-01-22 RX ORDER — PREDNISONE 10 MG/1
10 TABLET ORAL
Qty: 50 | Refills: 0 | Status: ACTIVE | COMMUNITY
Start: 2024-01-22 | End: 1900-01-01

## 2024-01-22 NOTE — REASON FOR VISIT
[Follow-Up] : a follow-up visit [FreeTextEntry1] : allergic rhinitis, severe persistent asthma, cough, GERD, low vitamin D, post-nasal drip, sinus issues, snoring and shortness of breath

## 2024-01-22 NOTE — ASSESSMENT
[FreeTextEntry1] : Ms. Mortensen is a 48 year old female currently symptomatic of eosinophilic/ allergic asthma, nasal polyps, allergy, and self-induced GERD. s/p influenza 1/2020 - active asthma / allergy / GERD- symptomatic. Self Rx Prednisone.- s/p nasal polypectomy (Gudis) 11/2021- s/p covid-19 infection 12/2021 - (mild asthma flair); Sinusitis / Active Asthma / polyp / reflux   Her shortness of breath is multifactorial due to: -out of shape -poor breathing mechanics -severe persistent asthma  problem 1: severe persistent asthma (semi active) -discontinue Albuterol nebulizer at least BID up to QiD (gargle and spit after use) -continue Breztri 2 inhalations BID -continue Xopenex (0.63) TID by nebulizer, PRN (gargle and rinse after use) -continue Pulmicort (Budesonide) (0.5) BID by nebulizer, PRN (gargle and rinse after use) - continue Xyflo 120 BID -self prednisone taper 3/2022 -add Prednisone 30 mg x 5 days, 20 mg x 5 days, 10 mg x 5 days -Asthma is believed to be caused by inherited (genetic) and environmental factor, but its exact cause is unknown. Asthma may be triggered by allergens, lung infections, or irritants in the air. Asthma triggers are different for each person  Problem 1A: Covid-19 infection 12/2021 (resolved) Problem: Health Maintenance/COVID19 Precautions -Recommend quarantine for 10 days -alkaseltzer  -s/p Prednisone 20 mg x 7 days, 10 mg x 7 days Information sheet given to the patient to be reviewed, this medication is never to be used without consulting the prescribing physician. Proper dietary restraint is necessary specifically salt containing foods, if any reaction may occur should be reported. -Stay home as much as possible. COVID-19 precautionary Immune Support Recommendations: -OTC Vitamin C 500mg BID -OTC Quercetin 250-500mg BID -OTC Zinc 75-100mg per day -OTC Melatonin 1 or 2mg a night -OTC Vitamin D 1-4000mg per day -OTC Tonic Water 8oz per day -Water 0.5-1 gallon per day Asthma and COVID19:  You need to make sure your asthma is under control. This often requires the use of inhaled corticosteroids (and sometimes oral corticosteroids). Inhaled corticosteroids do not likely reduce your immune system's ability to fight infections, but oral corticosteroids may. It is important to use the steps above to protect yourself to limit your exposure to any respiratory virus.  Problem 1B: eosinophilic Asthma (re d/w patient 3/17/2021) ; 12/2021 -EOS level 1713.3 on 5/22/2021 - Candidate for Dupixent/Fasenra/Nucala - Dupixent is a prescription medicine used with other asthma medicines for the maintenance treatment of moderate-to-severe asthma in people aged 12 years and older whose asthma is not controlled with their current asthma medicines. Dupixent helps prevent severe asthma attacks (exacerbations) and can improve your breathing. Dupixent may also help reduce the amount of oral corticosteroids you need while preventing severe asthma attacks and improving your breathing. Dupixent is not used to treat sudden breathing problems. Risks and side effect of Dupixent were discussed and reviewed with patient. -The safety and efficacy of Nucala was established in three double-blind, randomized, placebo-controlled trials in patients with severe asthma. Compared to a placebo, patients with severe asthma receiving Nucala had fewer exacerbation requiring hospitalization and/or emergency department visits, and a longer time to first exacerbation. In addition, patients with severe asthma receiving Nucala or Fasenra experienced greater reductions in their daily maintenance oral corticosteroid dose, while maintaining asthma control compared with patients receiving placebo. Treatment with Nucala did not result in a significant improvement in lung function, as measured by the volume of air exhaled by patients in one second. The most common side effects include: headache, injection site reactions, back pain, weakness, and fatigue; hypersensitivity reactions can occur within hours or days including swelling of the face, mouth, and tongue, fainting, dizziness, hives, breathing problems, and rash; herpes zoster infections have occurred. The drug is a monoclonal antibody that inhibits interleukin-5 which helps regular eosinophils, a type of white blood cell that contributes to asthma. The over-production of eosinophils can cause inflammation in the lungs, increasing the frequency of asthma attacks. Patients must also take other medications, including high dose inhaled corticosteroids and at least one additional asthma drug.  problem 2: allergies/sinus -get Blood work to include: asthma panel, food IgE panel, IgE level, eosinophil level, vitamin D level - Recommended the Navage -continue Xhance BID -continue Astelin (.15) 1 sniff each nostril BID -continue Zyrtec 10 mg QHS - Recheck eosinophil level, IgE level, Vitamin D level -Environmental measures for allergies were encouraged including mattress and pillow cover, air purifier, and environmental controls.  Problem 2A: Nasal Polyps -s/p polypectomy - Complete CT of Sinuses as per ENT - Candidate for Dupixent (hesitant) / Nucala Dupixent is a prescription medicine used with other asthma medicines for the maintenance treatment of moderate-to-severe asthma in people aged 12 years and older whose asthma is not controlled with their current asthma medicines. Dupixent helps prevent severe asthma attacks (exacerbations) and can improve your breathing. Dupixent may also help reduce the amount of oral corticosteroids you need while preventing severe asthma attacks and improving your breathing. Dupixent is not used to treat sudden breathing problems. Risks and side effect of Dupixent were discussed and reviewed with patient. -The safety and efficacy of Nucala was established in three double-blind, randomized, placebo-controlled trials in patients with severe asthma. Compared to a placebo, patients with severe asthma receiving Nucala had fewer exacerbation requiring hospitalization and/or emergency department visits, and a longer time to first exacerbation. In addition, patients with severe asthma receiving Nucala or Fasenra experienced greater reductions in their daily maintenance oral corticosteroid dose, while maintaining asthma control compared with patients receiving placebo. Treatment with Nucala did not result in a significant improvement in lung function, as measured by the volume of air exhaled by patients in one second. The most common side effects include: headache, injection site reactions, back pain, weakness, and fatigue; hypersensitivity reactions can occur within hours or days including swelling of the face, mouth, and tongue, fainting, dizziness, hives, breathing problems, and rash; herpes zoster infections have occurred. The drug is a monoclonal antibody that inhibits interleukin-5 which helps regular eosinophils, a type of white blood cell that contributes to asthma. The over-production of eosinophils can cause inflammation in the lungs, increasing the frequency of asthma attacks. Patients must also take other medications, including high dose inhaled corticosteroids and at least one additional asthma drug.  problem 3: GERD -continue Pepcid 40 mg QHS -Things to avoid including overeating, spicy foods, tight clothing, eating within three hours of bed, this list is not all inclusive. -For treatment of reflux, possible options discussed including diet control, H2 blockers, PPIs, as well as coating motility agents discussed as treatment options. Timing of meals and proximity of last meal to sleep were discussed. If symptoms persist, a formal gastrointestinal evaluation is needed. -Rule of 2's: Avoid eating too late, too fast, too much, too spicy or within two hours of bedtime  problem 4: low vitamin D -recommended to take Vitamin D supplements 50,000 units twice monthly -Low vitamin D Has been associated with asthma exacerbations and increased allergic symptoms. The goal based on recent information is maintaining levels between 50-70 and low normal is 30. Recommended 50,000 units every two weeks to once a month depending on the level.  Problem 5: r/o OSAS -She is to have a in lab sleep study to rule out OSAS (as per her insurance) (re-stressed) - Due to his / her EDS, fatigue, snoring, elevated Mallampati class, poor memory, poor concentration, and neck size, he / she is being recommended for a home sleep study. - Sleep apnea is associated with adverse clinical consequences which an affect most organ systems. Cardiovascular disease risk includes arrhythmias, atrial fibrillation, hypertension, coronary artery disease, and stroke. Metabolic disorders include diabetes type 2, non-alcoholic fatty liver disease. Mood disorder especially depression; and cognitive decline especially in the elderly. Associations with chronic reflux/Tuttle's esophagus some but not all inclusive. -Reasons include arousal consistent with hypopnea; respiratory events most prominent in REM sleep or supine position; therefore sleep staging and body position are important for accurate diagnosis and estimation of AHI.  problem 6: poor breathing mechanics -Recommended Widaniel Hof and Buteyko breathing techniques -Proper breathing techniques were reviewed with an emphasis of exhalation. Patient instructed to breath in for 1 second and out for four seconds. Patient was encouraged to not talk while walking.  problem 7: out of shape -Exercise and diet control were discussed and are highly encouraged. Treatment options were given such as, aqua therapy, and contacting a nutritionist. Recommended to use the elliptical, stationary bike, less use of treadmill. Mindful eating was explained to the patient. Obesity is associated with worsening asthma, shortness of breath, and potential for cardiac disease, diabetes, and other underlying medical conditions.  problem 8: health maintenance -educated patient on COVID 19 vaccine and appropriate recommendations (Pt. has COVID fears) -vax x2 5/2021 -recommended to continue to diet and exercise, try to have an anti-inflammatory diet -s/p influenza vaccination 2021 refused -recommended strep pneumonia vaccines: Prevnar-13 vaccine, followed by Pneumo vaccine 23 one year following -recommended early intervention for URIs -recommended regular osteoporosis evaluations -recommended early dermatological evaluations -recommended after the age of 50 to the age of 70, colonoscopy every 5 years  F/u in 2 months pt is encouraged to call or fax the office with any questions or concerns.

## 2024-01-22 NOTE — ADDENDUM
[FreeTextEntry1] : Documented by Anand Novak acting as a scribe for Dr. Jose Antonio Coe on 01/22/2024.   All medical record entries made by the Scribe were at my, Dr. Jose Antonio Coe's, direction and personally dictated by me on 01/22/2024. I have reviewed the chart and agree that the record accurately reflects my personal performance of the history, physical exam, assessment and plan. I have also personally directed, reviewed, and agree with the discharge instructions.

## 2024-01-22 NOTE — PHYSICAL EXAM
[No Acute Distress] : no acute distress [No Deformities] : no deformities [II] : Mallampati Class: II [Normal Oropharynx] : normal oropharynx [Normal Appearance] : normal appearance [No Neck Mass] : no neck mass [Normal Rate/Rhythm] : normal rate/rhythm [Normal S1, S2] : normal s1, s2 [No Murmurs] : no murmurs [No Resp Distress] : no resp distress [Clear to Auscultation Bilaterally] : clear to auscultation bilaterally [No Abnormalities] : no abnormalities [Benign] : benign [Normal Gait] : normal gait [No Cyanosis] : no cyanosis [No Clubbing] : no clubbing [No Edema] : no edema [FROM] : FROM [Normal Color/ Pigmentation] : normal color/ pigmentation [No Focal Deficits] : no focal deficits [Oriented x3] : oriented x3 [Normal Affect] : normal affect [TextBox_68] : I:E ratio 1:3; expiratory wheezes bilaterally

## 2024-01-22 NOTE — HISTORY OF PRESENT ILLNESS
[FreeTextEntry1] : Ms. Mortensen is a 48 year old female presenting to the office for a follow up visit for allergic rhinitis, asthma, cough, GERD, low vitamin D, post-nasal drip, sinus issues, snoring and shortness of breath. Her chief complaint is  - she notes getting a sinus infection just before the holidays in 12/2023 - she notes she was having headaches and some sinus pressure - she notes still having some PNDrip and chills - she notes having daily heartburn and reflux - she notes bowels are regular - she notes her sense of taste and smell could be better - she notes she has been wheezing recently  - she notes her eyes feel "sticky" when blinking  - she notes starting her nebulizer with Albuterol starting last week which is providing her much relief  - she notes she was last on steroids summer 2022 - she notes taking Omeprazole - she notes taking Xhance and Zyrtec for her allergies/sinuses - she notes her nasal polyps were removed in 2019 but her ENT (Willian Carballo) said they came back in her forehead  - she notes concerns over the side effects of a biologic shot   -she denies any headaches, nausea, emesis, fever, chills, sweats, chest pain, chest pressure, coughing, palpitations, constipation, diarrhea, vertigo, dysphagia, itchy eyes, itchy ears, leg swelling, leg pain, arthralgias, myalgias, or sour taste in the mouth.

## 2024-03-08 ENCOUNTER — RX RENEWAL (OUTPATIENT)
Age: 49
End: 2024-03-08

## 2024-04-19 ENCOUNTER — APPOINTMENT (OUTPATIENT)
Dept: PULMONOLOGY | Facility: CLINIC | Age: 49
End: 2024-04-19

## 2024-04-22 ENCOUNTER — APPOINTMENT (OUTPATIENT)
Dept: PULMONOLOGY | Facility: CLINIC | Age: 49
End: 2024-04-22

## 2024-06-03 RX ORDER — BUDESONIDE, GLYCOPYRROLATE, AND FORMOTEROL FUMARATE 160; 9; 4.8 UG/1; UG/1; UG/1
160-9-4.8 AEROSOL, METERED RESPIRATORY (INHALATION)
Qty: 3 | Refills: 1 | Status: ACTIVE | COMMUNITY
Start: 2021-03-17 | End: 1900-01-01

## 2024-06-05 ENCOUNTER — APPOINTMENT (OUTPATIENT)
Dept: PULMONOLOGY | Facility: CLINIC | Age: 49
End: 2024-06-05
Payer: COMMERCIAL

## 2024-06-05 VITALS
HEIGHT: 61 IN | WEIGHT: 120 LBS | DIASTOLIC BLOOD PRESSURE: 68 MMHG | BODY MASS INDEX: 22.66 KG/M2 | HEART RATE: 92 BPM | OXYGEN SATURATION: 98 % | RESPIRATION RATE: 16 BRPM | SYSTOLIC BLOOD PRESSURE: 132 MMHG | TEMPERATURE: 97.2 F

## 2024-06-05 DIAGNOSIS — R06.2 WHEEZING: ICD-10-CM

## 2024-06-05 DIAGNOSIS — J33.9 NASAL POLYP, UNSPECIFIED: ICD-10-CM

## 2024-06-05 DIAGNOSIS — R09.82 POSTNASAL DRIP: ICD-10-CM

## 2024-06-05 DIAGNOSIS — R09.81 NASAL CONGESTION: ICD-10-CM

## 2024-06-05 DIAGNOSIS — K21.9 GASTRO-ESOPHAGEAL REFLUX DISEASE W/OUT ESOPHAGITIS: ICD-10-CM

## 2024-06-05 DIAGNOSIS — J30.9 ALLERGIC RHINITIS, UNSPECIFIED: ICD-10-CM

## 2024-06-05 DIAGNOSIS — R06.02 SHORTNESS OF BREATH: ICD-10-CM

## 2024-06-05 DIAGNOSIS — R79.89 OTHER SPECIFIED ABNORMAL FINDINGS OF BLOOD CHEMISTRY: ICD-10-CM

## 2024-06-05 DIAGNOSIS — D64.9 ANEMIA, UNSPECIFIED: ICD-10-CM

## 2024-06-05 DIAGNOSIS — J45.51 SEVERE PERSISTENT ASTHMA WITH (ACUTE) EXACERBATION: ICD-10-CM

## 2024-06-05 DIAGNOSIS — J30.2 OTHER SEASONAL ALLERGIC RHINITIS: ICD-10-CM

## 2024-06-05 DIAGNOSIS — J45.909 UNSPECIFIED ASTHMA, UNCOMPLICATED: ICD-10-CM

## 2024-06-05 DIAGNOSIS — J82.83 EOSINOPHILIC ASTHMA: ICD-10-CM

## 2024-06-05 PROCEDURE — G2211 COMPLEX E/M VISIT ADD ON: CPT | Mod: NC,1L

## 2024-06-05 PROCEDURE — 99215 OFFICE O/P EST HI 40 MIN: CPT

## 2024-06-05 RX ORDER — FLUTICASONE FUROATE, UMECLIDINIUM BROMIDE AND VILANTEROL TRIFENATATE 200; 62.5; 25 UG/1; UG/1; UG/1
200-62.5-25 POWDER RESPIRATORY (INHALATION)
Qty: 1 | Refills: 3 | Status: ACTIVE | COMMUNITY
Start: 2024-06-05 | End: 1900-01-01

## 2024-06-05 RX ORDER — ALBUTEROL SULFATE 90 UG/1
108 (90 BASE) AEROSOL, METERED RESPIRATORY (INHALATION) EVERY 6 HOURS
Qty: 1 | Refills: 2 | Status: ACTIVE | COMMUNITY
Start: 2024-06-05 | End: 1900-01-01

## 2024-06-05 RX ORDER — ZILEUTON 600 MG/1
600 TABLET, FILM COATED, EXTENDED RELEASE ORAL
Qty: 60 | Refills: 3 | Status: ACTIVE | COMMUNITY
Start: 2018-03-27 | End: 1900-01-01

## 2024-06-05 RX ORDER — PREDNISONE 10 MG/1
10 TABLET ORAL
Qty: 21 | Refills: 0 | Status: ACTIVE | COMMUNITY
Start: 2021-12-30 | End: 1900-01-01

## 2024-06-05 NOTE — HISTORY OF PRESENT ILLNESS
[FreeTextEntry1] : TODAY'S VISIT 6/5/2024 Ms. Mortensen is a 49 year old female presenting to the office for a follow up visit for allergic rhinitis, asthma, cough, GERD, low vitamin D, post-nasal drip, sinus issues, snoring and shortness of breath. Her chief complaint is  -reports she has been taking Breztri inhaler for past 3 years which has controlled her asthma symptoms but recently found that it lasts only 8-9 hours.  -reports sob with exertion; states she been using Budesonide nebulizer -reports chest congestion; takes Xhance nasal spray and Neti pot for sinus irrigation; states that she thinks her polyps are returning; has surgery in past with Dr. Willian Carballo ([facility]) -reports productive cough (yellow sputum) during this past allergy season -reports she has been given Prednisone multiple times in the past -reports s/p right tib fib surgery 7 years ago after a fall -reports she was put on Omeprazole in October 2023 by GI Dr. Coello -reports she was told she had low Hgb (6) in the past requiring iron infusion; has not seen hematologist or PCP recently; requesting referrals for both -reports she was told she had low Vit D but stopped taking supplements -reports sleep symptoms: fatigue, poor memory  denies any headaches, nausea, vomiting, fever, chills, sweats, chest pain, chest pressure, palpitations, constipation, dysphagia, dizziness, leg swelling, leg pain, itchy eyes, itchy ears, heartburn, reflux or sour taste in the mouth.   LAST VISIT 1/2024 Ms. Mortensen is a 48 year old female presenting to the office for a follow up visit for allergic rhinitis, asthma, cough, GERD, low vitamin D, post-nasal drip, sinus issues, snoring and shortness of breath. Her chief complaint is  - she notes getting a sinus infection just before the holidays in 12/2023 - she notes she was having headaches and some sinus pressure - she notes still having some PNDrip and chills - she notes having daily heartburn and reflux - she notes bowels are regular - she notes her sense of taste and smell could be better - she notes she has been wheezing recently  - she notes her eyes feel "sticky" when blinking  - she notes starting her nebulizer with Albuterol starting last week which is providing her much relief  - she notes she was last on steroids summer 2022 - she notes taking Omeprazole - she notes taking Xhance and Zyrtec for her allergies/sinuses - she notes her nasal polyps were removed in 2019 but her ENT (Willian Carballo) said they came back in her forehead  - she notes concerns over the side effects of a biologic shot   -she denies any headaches, nausea, emesis, fever, chills, sweats, chest pain, chest pressure, coughing, palpitations, constipation, diarrhea, vertigo, dysphagia, itchy eyes, itchy ears, leg swelling, leg pain, arthralgias, myalgias, or sour taste in the mouth.

## 2024-06-05 NOTE — REVIEW OF SYSTEMS
[Negative] : Endocrine [Sinus Problems] : sinus problems [Cough] : cough [Sputum] : sputum [History of Iron Deficiency] : history of iron deficiency [Dyspnea] : no dyspnea [TextBox_113] : anemia history; h/o iron infusions

## 2024-06-05 NOTE — ASSESSMENT
[FreeTextEntry1] : Ms. Mortensen is a 49 year old female currently symptomatic of eosinophilic/ allergic asthma, nasal polyps, allergy, and GERD. s/p influenza 1/2020 - active asthma / allergy / GERD, s/p nasal polypectomy ([facility]) 11/2021- s/p covid-19 infection 12/2021   Her shortness of breath is multifactorial due to: -out of shape -poor breathing mechanics -severe persistent asthma  Problem 1: Severe persistent asthma (active) -Add Prednisone 20 mg x 7 days, 10 mg x 7 days -Trial of Trelegy 200 BID (rinse and gargle) -hold Breztri 2 inhalations BID -Add Ipratropium-Albuterol BID up to 4xday by nebulizer -continue Pulmicort (Budesonide) (0.5) BID by nebulizer, PRN (gargle and rinse after use) - Restart Xyflo 1200 BID (NC)   -s/p Prednisone 30 mg x 5 days, 20 mg x 5 days, 10 mg x 5 days (1/2024, 3/2023)  -s/p Prednisone 20 mg x 7 days, 10 mg x 7 days (2/2022, 12/2021, 3/2020) -Asthma is believed to be caused by inherited (genetic) and environmental factor, but its exact cause is unknown. Asthma may be triggered by allergens, lung infections, or irritants in the air. Asthma triggers are different for each person  Problem 1A: Covid-19 infection 12/2021 (resolved) Problem: Health Maintenance/COVID19 Precautions -Recommend quarantine for 10 days You need to make sure your asthma is under control. This often requires the use of inhaled corticosteroids (and sometimes oral corticosteroids). Inhaled corticosteroids do not likely reduce your immune system's ability to fight infections, but oral corticosteroids may. It is important to use the steps above to protect yourself to limit your exposure to any respiratory virus.  Problem 1B: eosinophilic Asthma (re d/w patient 3/17/2021) ; 12/2021 -EOS level 1713.3 on 5/22/2021 - Candidate for Dupixent/Fasenra/Nucala. Discussed biologics again due to recurrent need for Prednisone.  -Repeat CBC with diff ordered - Dupixent is a prescription medicine used with other asthma medicines for the maintenance treatment of moderate-to-severe asthma in people aged 12 years and older whose asthma is not controlled with their current asthma medicines. Dupixent helps prevent severe asthma attacks (exacerbations) and can improve your breathing. Dupixent may also help reduce the amount of oral corticosteroids you need while preventing severe asthma attacks and improving your breathing. Dupixent is not used to treat sudden breathing problems. Risks and side effect of Dupixent were discussed and reviewed with patient. -The safety and efficacy of Nucala was established in three double-blind, randomized, placebo-controlled trials in patients with severe asthma. Compared to a placebo, patients with severe asthma receiving Nucala had fewer exacerbation requiring hospitalization and/or emergency department visits, and a longer time to first exacerbation. In addition, patients with severe asthma receiving Nucala or Fasenra experienced greater reductions in their daily maintenance oral corticosteroid dose, while maintaining asthma control compared with patients receiving placebo. Treatment with Nucala did not result in a significant improvement in lung function, as measured by the volume of air exhaled by patients in one second. The most common side effects include: headache, injection site reactions, back pain, weakness, and fatigue; hypersensitivity reactions can occur within hours or days including swelling of the face, mouth, and tongue, fainting, dizziness, hives, breathing problems, and rash; herpes zoster infections have occurred. The drug is a monoclonal antibody that inhibits interleukin-5 which helps regular eosinophils, a type of white blood cell that contributes to asthma. The over-production of eosinophils can cause inflammation in the lungs, increasing the frequency of asthma attacks. Patients must also take other medications, including high dose inhaled corticosteroids and at least one additional asthma drug.  Problem 1C: SOB r/t anemia? (last Hgb 9.2 in 5/2021) -Referral given for hematology follow up (Dr. Alvarez, Dr. Arango)- Requested for Navigator team to expedite scheduling -Referral for PCP (Dr. Sotelo, Dr. Friedman)  problem 2: allergies/sinus (eosinophil 1710 in 5/2021) -Reordered bloodwork: asthma panel, food IgE panel, IgE level, CBC with differential, eosinophil level (+), vitamin D level (+) -continue neti-pot sinus irrigation -continue Xhance (fluticasone) BID -continue Azelastine 1 sniff each nostril BID -Add Levocetirizine 10 mg QHS; MS Zyrtec -Environmental measures for allergies were encouraged including mattress and pillow cover, air purifier, and environmental controls.  Problem 2A: Nasal Polyps (Dr. Carballo, ENT [facility]) -scheduled to see ENT next week -s/p polypectomy - Complete CT of Sinuses as per ENT - Candidate for Dupixent (hesitant) / Nucala Dupixent is a prescription medicine used with other asthma medicines for the maintenance treatment of moderate-to-severe asthma in people aged 12 years and older whose asthma is not controlled with their current asthma medicines. Dupixent helps prevent severe asthma attacks (exacerbations) and can improve your breathing. Dupixent may also help reduce the amount of oral corticosteroids you need while preventing severe asthma attacks and improving your breathing. Dupixent is not used to treat sudden breathing problems. Risks and side effect of Dupixent were discussed and reviewed with patient. -The safety and efficacy of Nucala was established in three double-blind, randomized, placebo-controlled trials in patients with severe asthma. Compared to a placebo, patients with severe asthma receiving Nucala had fewer exacerbation requiring hospitalization and/or emergency department visits, and a longer time to first exacerbation. In addition, patients with severe asthma receiving Nucala or Fasenra experienced greater reductions in their daily maintenance oral corticosteroid dose, while maintaining asthma control compared with patients receiving placebo. Treatment with Nucala did not result in a significant improvement in lung function, as measured by the volume of air exhaled by patients in one second. The most common side effects include: headache, injection site reactions, back pain, weakness, and fatigue; hypersensitivity reactions can occur within hours or days including swelling of the face, mouth, and tongue, fainting, dizziness, hives, breathing problems, and rash; herpes zoster infections have occurred. The drug is a monoclonal antibody that inhibits interleukin-5 which helps regular eosinophils, a type of white blood cell that contributes to asthma. The over-production of eosinophils can cause inflammation in the lungs, increasing the frequency of asthma attacks. Patients must also take other medications, including high dose inhaled corticosteroids and at least one additional asthma drug.  problem 3: GERD (Dr. Latoya Coello [facility]) -continue Omeprazole 40 mg in AM (as per Dr. Coello) -For treatment of reflux, possible options discussed including diet control, H2 blockers, PPIs, as well as coating motility agents discussed as treatment options. Timing of meals and proximity of last meal to sleep were discussed. If symptoms persist, a formal gastrointestinal evaluation is needed. -Rule of 2's: Avoid eating too late, too fast, too much, too spicy or within two hours of bedtime  problem 4: low vitamin D -repeat Vit D bloodwork  -recommended to take Vitamin D supplements 50,000 units twice monthly (NC) -Low vitamin D Has been associated with asthma exacerbations and increased allergic symptoms. The goal based on recent information is maintaining levels between 50-70 and low normal is 30. Recommended 50,000 units every two weeks to once a month depending on the level.  Problem 5: r/o OSAS (fatigue, poor memory, GERD) -Order placed for [employer] HST - Sleep apnea is associated with adverse clinical consequences which an affect most organ systems. Cardiovascular disease risk includes arrhythmias, atrial fibrillation, hypertension, coronary artery disease, and stroke. Metabolic disorders include diabetes type 2, non-alcoholic fatty liver disease. Mood disorder especially depression; and cognitive decline especially in the elderly. Associations with chronic reflux/Tuttle's esophagus some but not all inclusive. -Reasons include arousal consistent with hypopnea; respiratory events most prominent in REM sleep or supine position; therefore sleep staging and body position are important for accurate diagnosis and estimation of AHI.  problem 6: Weight management -Recommended daily 30 min walk daily 5days/week -Exercise and diet control were discussed and are highly encouraged. Treatment options were given such as, aqua therapy, and contacting a nutritionist. Recommended to use the elliptical, stationary bike, less use of treadmill. Mindful eating was explained to the patient. Obesity is associated with worsening asthma, shortness of breath, and potential for cardiac disease, diabetes, and other underlying medical conditions.  problem 7: health maintenance -Order placed for DEXA scan (multiple courses of Prednisone, s/p tib-fib fx after fall 2017, PPI use) -recommended regular osteoporosis evaluations  F/u in 1 month with NP with PFTs and NIOX; 4-6 months with Dr. Coe pt is encouraged to call or fax the office with any questions or concerns.
Yes

## 2024-06-05 NOTE — PLAN
[FreeTextEntry1] : Mastodynia and left Nipple Discharge\par -rx for screening mammo given-advised urgent; if having trouble getting appt in next week to call office; also given rx for targeted left breast dx mammo and targeted sono. \par -bse reviewed\par -normal exam today-only tenderness to palpation note; no nipple d/c\par -if continues consider referral to breast MD \par 
pt c/o R great toe infection and b/l leg pins and needles when laying down.

## 2024-06-05 NOTE — PHYSICAL EXAM
[No Acute Distress] : no acute distress [No Deformities] : no deformities [Normal Oropharynx] : normal oropharynx [II] : Mallampati Class: II [Normal Appearance] : normal appearance [No Neck Mass] : no neck mass [Normal Rate/Rhythm] : normal rate/rhythm [Normal S1, S2] : normal s1, s2 [No Murmurs] : no murmurs [No Resp Distress] : no resp distress [No Abnormalities] : no abnormalities [Benign] : benign [Normal Gait] : normal gait [FROM] : FROM [Normal Color/ Pigmentation] : normal color/ pigmentation [No Focal Deficits] : no focal deficits [Oriented x3] : oriented x3 [Normal Affect] : normal affect [Wheeze] : wheeze

## 2024-06-07 ENCOUNTER — NON-APPOINTMENT (OUTPATIENT)
Age: 49
End: 2024-06-07

## 2024-06-13 ENCOUNTER — OUTPATIENT (OUTPATIENT)
Dept: OUTPATIENT SERVICES | Facility: HOSPITAL | Age: 49
LOS: 1 days | Discharge: HOME | End: 2024-06-13

## 2024-06-13 DIAGNOSIS — D64.9 ANEMIA, UNSPECIFIED: ICD-10-CM

## 2024-06-14 ENCOUNTER — RESULT REVIEW (OUTPATIENT)
Age: 49
End: 2024-06-14

## 2024-06-14 ENCOUNTER — APPOINTMENT (OUTPATIENT)
Dept: HEMATOLOGY ONCOLOGY | Facility: CLINIC | Age: 49
End: 2024-06-14

## 2024-06-14 ENCOUNTER — OUTPATIENT (OUTPATIENT)
Dept: OUTPATIENT SERVICES | Facility: HOSPITAL | Age: 49
LOS: 1 days | End: 2024-06-14
Payer: COMMERCIAL

## 2024-06-14 DIAGNOSIS — D64.9 ANEMIA, UNSPECIFIED: ICD-10-CM

## 2024-06-14 PROCEDURE — 86923 COMPATIBILITY TEST ELECTRIC: CPT

## 2024-06-14 PROCEDURE — 86900 BLOOD TYPING SEROLOGIC ABO: CPT

## 2024-06-14 PROCEDURE — 86850 RBC ANTIBODY SCREEN: CPT

## 2024-06-14 PROCEDURE — 86901 BLOOD TYPING SEROLOGIC RH(D): CPT

## 2024-06-17 ENCOUNTER — APPOINTMENT (OUTPATIENT)
Dept: INFUSION THERAPY | Facility: HOSPITAL | Age: 49
End: 2024-06-17

## 2024-06-19 DIAGNOSIS — Z51.89 ENCOUNTER FOR OTHER SPECIFIED AFTERCARE: ICD-10-CM

## 2024-06-19 DIAGNOSIS — D50.9 IRON DEFICIENCY ANEMIA, UNSPECIFIED: ICD-10-CM

## 2024-06-26 ENCOUNTER — APPOINTMENT (OUTPATIENT)
Dept: PULMONOLOGY | Facility: CLINIC | Age: 49
End: 2024-06-26

## 2024-07-15 ENCOUNTER — APPOINTMENT (OUTPATIENT)
Dept: PULMONOLOGY | Facility: CLINIC | Age: 49
End: 2024-07-15

## 2024-07-22 ENCOUNTER — APPOINTMENT (OUTPATIENT)
Dept: PULMONOLOGY | Facility: CLINIC | Age: 49
End: 2024-07-22

## 2024-09-24 ENCOUNTER — APPOINTMENT (OUTPATIENT)
Dept: OBGYN | Facility: CLINIC | Age: 49
End: 2024-09-24

## 2024-10-08 ENCOUNTER — APPOINTMENT (OUTPATIENT)
Age: 49
End: 2024-10-08

## 2024-10-25 ENCOUNTER — RX RENEWAL (OUTPATIENT)
Age: 49
End: 2024-10-25

## 2024-10-28 ENCOUNTER — APPOINTMENT (OUTPATIENT)
Dept: OBGYN | Facility: CLINIC | Age: 49
End: 2024-10-28
Payer: COMMERCIAL

## 2024-10-28 ENCOUNTER — NON-APPOINTMENT (OUTPATIENT)
Age: 49
End: 2024-10-28

## 2024-10-28 ENCOUNTER — RX RENEWAL (OUTPATIENT)
Age: 49
End: 2024-10-28

## 2024-10-28 VITALS
HEART RATE: 73 BPM | WEIGHT: 120 LBS | BODY MASS INDEX: 22.66 KG/M2 | SYSTOLIC BLOOD PRESSURE: 158 MMHG | TEMPERATURE: 98.1 F | HEIGHT: 61 IN | OXYGEN SATURATION: 97 % | DIASTOLIC BLOOD PRESSURE: 95 MMHG

## 2024-10-28 DIAGNOSIS — N80.03 ADENOMYOSIS OF THE UTERUS: ICD-10-CM

## 2024-10-28 DIAGNOSIS — N92.0 EXCESSIVE AND FREQUENT MENSTRUATION WITH REGULAR CYCLE: ICD-10-CM

## 2024-10-28 PROCEDURE — 99459 PELVIC EXAMINATION: CPT

## 2024-10-28 PROCEDURE — 99214 OFFICE O/P EST MOD 30 MIN: CPT

## 2024-10-28 RX ORDER — ALBUTEROL SULFATE 90 UG/1
108 (90 BASE) INHALANT RESPIRATORY (INHALATION)
Qty: 1 | Refills: 2 | Status: ACTIVE | COMMUNITY
Start: 2024-10-28 | End: 1900-01-01

## 2024-10-29 LAB
ANION GAP SERPL CALC-SCNC: 14 MMOL/L
BUN SERPL-MCNC: 9 MG/DL
C TRACH RRNA SPEC QL NAA+PROBE: NOT DETECTED
CALCIUM SERPL-MCNC: 9.2 MG/DL
CHLORIDE SERPL-SCNC: 100 MMOL/L
CO2 SERPL-SCNC: 23 MMOL/L
CREAT SERPL-MCNC: 0.74 MG/DL
EGFR: 100 ML/MIN/1.73M2
GLUCOSE SERPL-MCNC: 76 MG/DL
HCT VFR BLD CALC: 39.3 %
HGB BLD-MCNC: 12.6 G/DL
HPV HIGH+LOW RISK DNA PNL CVX: NOT DETECTED
MCHC RBC-ENTMCNC: 29.3 PG
MCHC RBC-ENTMCNC: 32.1 GM/DL
MCV RBC AUTO: 91.4 FL
N GONORRHOEA RRNA SPEC QL NAA+PROBE: NOT DETECTED
PLATELET # BLD AUTO: 478 K/UL
POTASSIUM SERPL-SCNC: 5.4 MMOL/L
RBC # BLD: 4.3 M/UL
RBC # FLD: 14.1 %
SODIUM SERPL-SCNC: 137 MMOL/L
SOURCE TP AMPLIFICATION: NORMAL
TSH SERPL-ACNC: 1.26 UIU/ML
WBC # FLD AUTO: 10.33 K/UL

## 2024-10-31 LAB — CYTOLOGY CVX/VAG DOC THIN PREP: NORMAL

## 2024-11-05 ENCOUNTER — APPOINTMENT (OUTPATIENT)
Age: 49
End: 2024-11-05
Payer: COMMERCIAL

## 2024-11-05 VITALS
SYSTOLIC BLOOD PRESSURE: 162 MMHG | HEART RATE: 74 BPM | RESPIRATION RATE: 14 BRPM | OXYGEN SATURATION: 97 % | TEMPERATURE: 97 F | WEIGHT: 120 LBS | HEIGHT: 65 IN | BODY MASS INDEX: 19.99 KG/M2 | DIASTOLIC BLOOD PRESSURE: 113 MMHG

## 2024-11-05 DIAGNOSIS — D50.9 IRON DEFICIENCY ANEMIA, UNSPECIFIED: ICD-10-CM

## 2024-11-05 DIAGNOSIS — I10 ESSENTIAL (PRIMARY) HYPERTENSION: ICD-10-CM

## 2024-11-05 DIAGNOSIS — J30.9 ALLERGIC RHINITIS, UNSPECIFIED: ICD-10-CM

## 2024-11-05 DIAGNOSIS — K21.9 GASTRO-ESOPHAGEAL REFLUX DISEASE W/OUT ESOPHAGITIS: ICD-10-CM

## 2024-11-05 DIAGNOSIS — J82.83 EOSINOPHILIC ASTHMA: ICD-10-CM

## 2024-11-05 PROCEDURE — 99386 PREV VISIT NEW AGE 40-64: CPT | Mod: 25

## 2024-11-05 PROCEDURE — 99203 OFFICE O/P NEW LOW 30 MIN: CPT | Mod: 25

## 2024-11-05 PROCEDURE — 36415 COLL VENOUS BLD VENIPUNCTURE: CPT

## 2024-11-05 RX ORDER — AMLODIPINE BESYLATE 5 MG/1
5 TABLET ORAL DAILY
Qty: 30 | Refills: 1 | Status: ACTIVE | COMMUNITY
Start: 2024-11-05 | End: 1900-01-01

## 2024-11-11 ENCOUNTER — APPOINTMENT (OUTPATIENT)
Age: 49
End: 2024-11-11

## 2024-12-03 ENCOUNTER — APPOINTMENT (OUTPATIENT)
Dept: PULMONOLOGY | Facility: CLINIC | Age: 49
End: 2024-12-03
Payer: COMMERCIAL

## 2024-12-03 VITALS
HEIGHT: 60 IN | HEART RATE: 77 BPM | BODY MASS INDEX: 23.95 KG/M2 | WEIGHT: 122 LBS | RESPIRATION RATE: 16 BRPM | DIASTOLIC BLOOD PRESSURE: 84 MMHG | TEMPERATURE: 97.3 F | SYSTOLIC BLOOD PRESSURE: 126 MMHG | OXYGEN SATURATION: 98 %

## 2024-12-03 DIAGNOSIS — J45.51 SEVERE PERSISTENT ASTHMA WITH (ACUTE) EXACERBATION: ICD-10-CM

## 2024-12-03 DIAGNOSIS — K21.9 GASTRO-ESOPHAGEAL REFLUX DISEASE W/OUT ESOPHAGITIS: ICD-10-CM

## 2024-12-03 DIAGNOSIS — R06.02 SHORTNESS OF BREATH: ICD-10-CM

## 2024-12-03 DIAGNOSIS — J82.83 EOSINOPHILIC ASTHMA: ICD-10-CM

## 2024-12-03 DIAGNOSIS — J30.9 ALLERGIC RHINITIS, UNSPECIFIED: ICD-10-CM

## 2024-12-03 DIAGNOSIS — J45.909 UNSPECIFIED ASTHMA, UNCOMPLICATED: ICD-10-CM

## 2024-12-03 PROCEDURE — 94727 GAS DIL/WSHOT DETER LNG VOL: CPT

## 2024-12-03 PROCEDURE — 99214 OFFICE O/P EST MOD 30 MIN: CPT | Mod: 25

## 2024-12-03 PROCEDURE — 95012 NITRIC OXIDE EXP GAS DETER: CPT

## 2024-12-03 PROCEDURE — 94729 DIFFUSING CAPACITY: CPT

## 2024-12-03 PROCEDURE — 94010 BREATHING CAPACITY TEST: CPT

## 2024-12-03 RX ORDER — OLOPATADINE HYDROCHLORIDE 665 UG/1
0.6 SPRAY, METERED NASAL
Qty: 3 | Refills: 1 | Status: ACTIVE | COMMUNITY
Start: 2024-12-03 | End: 1900-01-01

## 2024-12-03 RX ORDER — ALBUTEROL SULFATE AND BUDESONIDE 90; 80 UG/1; UG/1
90-80 AEROSOL, METERED RESPIRATORY (INHALATION)
Qty: 1 | Refills: 3 | Status: ACTIVE | COMMUNITY
Start: 2024-12-03 | End: 1900-01-01

## 2024-12-24 ENCOUNTER — APPOINTMENT (OUTPATIENT)
Age: 49
End: 2024-12-24
Payer: COMMERCIAL

## 2024-12-24 VITALS
SYSTOLIC BLOOD PRESSURE: 127 MMHG | WEIGHT: 121 LBS | TEMPERATURE: 97.2 F | RESPIRATION RATE: 14 BRPM | OXYGEN SATURATION: 94 % | HEART RATE: 82 BPM | BODY MASS INDEX: 22.84 KG/M2 | HEIGHT: 61 IN | DIASTOLIC BLOOD PRESSURE: 63 MMHG

## 2024-12-24 DIAGNOSIS — J01.00 ACUTE MAXILLARY SINUSITIS, UNSPECIFIED: ICD-10-CM

## 2024-12-24 PROCEDURE — 99213 OFFICE O/P EST LOW 20 MIN: CPT

## 2024-12-24 PROCEDURE — G2211 COMPLEX E/M VISIT ADD ON: CPT | Mod: NC

## 2024-12-24 RX ORDER — AMOXICILLIN AND CLAVULANATE POTASSIUM 875; 125 MG/1; MG/1
875-125 TABLET, COATED ORAL
Qty: 20 | Refills: 0 | Status: ACTIVE | COMMUNITY
Start: 2024-12-24 | End: 1900-01-01

## 2025-01-02 ENCOUNTER — RX RENEWAL (OUTPATIENT)
Age: 50
End: 2025-01-02

## 2025-03-28 ENCOUNTER — RX RENEWAL (OUTPATIENT)
Age: 50
End: 2025-03-28

## 2025-04-22 ENCOUNTER — APPOINTMENT (OUTPATIENT)
Dept: PULMONOLOGY | Facility: CLINIC | Age: 50
End: 2025-04-22
Payer: COMMERCIAL

## 2025-04-22 ENCOUNTER — NON-APPOINTMENT (OUTPATIENT)
Age: 50
End: 2025-04-22

## 2025-04-22 VITALS
TEMPERATURE: 97.3 F | RESPIRATION RATE: 14 BRPM | SYSTOLIC BLOOD PRESSURE: 118 MMHG | OXYGEN SATURATION: 95 % | BODY MASS INDEX: 22.84 KG/M2 | WEIGHT: 121 LBS | HEART RATE: 61 BPM | HEIGHT: 61 IN | DIASTOLIC BLOOD PRESSURE: 68 MMHG

## 2025-04-22 DIAGNOSIS — K21.9 GASTRO-ESOPHAGEAL REFLUX DISEASE W/OUT ESOPHAGITIS: ICD-10-CM

## 2025-04-22 DIAGNOSIS — J30.9 ALLERGIC RHINITIS, UNSPECIFIED: ICD-10-CM

## 2025-04-22 DIAGNOSIS — J30.2 OTHER SEASONAL ALLERGIC RHINITIS: ICD-10-CM

## 2025-04-22 DIAGNOSIS — J45.51 SEVERE PERSISTENT ASTHMA WITH (ACUTE) EXACERBATION: ICD-10-CM

## 2025-04-22 DIAGNOSIS — R79.89 OTHER SPECIFIED ABNORMAL FINDINGS OF BLOOD CHEMISTRY: ICD-10-CM

## 2025-04-22 DIAGNOSIS — R06.02 SHORTNESS OF BREATH: ICD-10-CM

## 2025-04-22 DIAGNOSIS — J82.83 EOSINOPHILIC ASTHMA: ICD-10-CM

## 2025-04-22 PROCEDURE — 94010 BREATHING CAPACITY TEST: CPT

## 2025-04-22 PROCEDURE — 95012 NITRIC OXIDE EXP GAS DETER: CPT

## 2025-04-22 PROCEDURE — 99214 OFFICE O/P EST MOD 30 MIN: CPT | Mod: 25

## 2025-04-22 RX ORDER — DESLORATADINE 5 MG/1
5 TABLET ORAL
Qty: 90 | Refills: 1 | Status: ACTIVE | COMMUNITY
Start: 2025-04-22 | End: 1900-01-01

## 2025-04-22 RX ORDER — LEVOCETIRIZINE DIHYDROCHLORIDE 5 MG/1
5 TABLET ORAL
Qty: 1 | Refills: 1 | Status: ACTIVE | COMMUNITY
Start: 2025-04-22 | End: 1900-01-01

## 2025-07-22 ENCOUNTER — APPOINTMENT (OUTPATIENT)
Age: 50
End: 2025-07-22
Payer: COMMERCIAL

## 2025-07-22 VITALS
WEIGHT: 120 LBS | OXYGEN SATURATION: 100 % | HEART RATE: 64 BPM | RESPIRATION RATE: 14 BRPM | SYSTOLIC BLOOD PRESSURE: 163 MMHG | BODY MASS INDEX: 23.56 KG/M2 | TEMPERATURE: 97.6 F | HEIGHT: 60 IN | DIASTOLIC BLOOD PRESSURE: 107 MMHG

## 2025-07-22 VITALS — DIASTOLIC BLOOD PRESSURE: 80 MMHG | SYSTOLIC BLOOD PRESSURE: 148 MMHG

## 2025-07-22 DIAGNOSIS — J45.51 SEVERE PERSISTENT ASTHMA WITH (ACUTE) EXACERBATION: ICD-10-CM

## 2025-07-22 DIAGNOSIS — N92.0 EXCESSIVE AND FREQUENT MENSTRUATION WITH REGULAR CYCLE: ICD-10-CM

## 2025-07-22 DIAGNOSIS — N80.9 ENDOMETRIOSIS, UNSPECIFIED: ICD-10-CM

## 2025-07-22 DIAGNOSIS — D50.9 IRON DEFICIENCY ANEMIA, UNSPECIFIED: ICD-10-CM

## 2025-07-22 DIAGNOSIS — I10 ESSENTIAL (PRIMARY) HYPERTENSION: ICD-10-CM

## 2025-07-22 PROCEDURE — 99214 OFFICE O/P EST MOD 30 MIN: CPT

## 2025-07-22 PROCEDURE — G2211 COMPLEX E/M VISIT ADD ON: CPT | Mod: NC
